# Patient Record
Sex: FEMALE | Race: WHITE | HISPANIC OR LATINO | Employment: OTHER | URBAN - METROPOLITAN AREA
[De-identification: names, ages, dates, MRNs, and addresses within clinical notes are randomized per-mention and may not be internally consistent; named-entity substitution may affect disease eponyms.]

---

## 2020-05-14 RX ORDER — RALOXIFENE HYDROCHLORIDE 60 MG/1
TABLET, FILM COATED ORAL
Qty: 90 TABLET | OUTPATIENT
Start: 2020-05-14

## 2020-05-19 ENCOUNTER — OFFICE VISIT (OUTPATIENT)
Dept: OBSTETRICS AND GYNECOLOGY | Facility: CLINIC | Age: 70
End: 2020-05-19
Payer: MEDICARE

## 2020-05-19 VITALS
WEIGHT: 196.81 LBS | DIASTOLIC BLOOD PRESSURE: 70 MMHG | SYSTOLIC BLOOD PRESSURE: 144 MMHG | HEIGHT: 66 IN | BODY MASS INDEX: 31.63 KG/M2

## 2020-05-19 DIAGNOSIS — Z78.0 POST-MENOPAUSAL: ICD-10-CM

## 2020-05-19 DIAGNOSIS — Z12.39 BREAST CANCER SCREENING: ICD-10-CM

## 2020-05-19 DIAGNOSIS — R63.5 WEIGHT GAIN: ICD-10-CM

## 2020-05-19 DIAGNOSIS — Z01.419 WELL WOMAN EXAM WITH ROUTINE GYNECOLOGICAL EXAM: Primary | ICD-10-CM

## 2020-05-19 DIAGNOSIS — Z12.31 ENCOUNTER FOR SCREENING MAMMOGRAM FOR MALIGNANT NEOPLASM OF BREAST: ICD-10-CM

## 2020-05-19 PROCEDURE — G0101 PR CA SCREEN;PELVIC/BREAST EXAM: ICD-10-PCS | Mod: S$GLB,,, | Performed by: OBSTETRICS & GYNECOLOGY

## 2020-05-19 PROCEDURE — G0101 CA SCREEN;PELVIC/BREAST EXAM: HCPCS | Mod: S$GLB,,, | Performed by: OBSTETRICS & GYNECOLOGY

## 2020-05-19 RX ORDER — FOLIC ACID 1 MG/1
TABLET ORAL
COMMUNITY
Start: 2020-04-12 | End: 2023-06-20

## 2020-05-19 RX ORDER — ROPINIROLE 0.25 MG/1
TABLET, FILM COATED ORAL
COMMUNITY
Start: 2020-05-08 | End: 2023-06-20

## 2020-05-19 RX ORDER — METHOTREXATE 20 MG/.4ML
INJECTION, SOLUTION SUBCUTANEOUS
COMMUNITY
Start: 2020-03-16 | End: 2022-03-17 | Stop reason: ALTCHOICE

## 2020-05-19 RX ORDER — DULOXETIN HYDROCHLORIDE 60 MG/1
CAPSULE, DELAYED RELEASE ORAL
COMMUNITY
Start: 2020-04-19

## 2020-05-19 RX ORDER — PREGABALIN 75 MG/1
CAPSULE ORAL
COMMUNITY
Start: 2020-05-12 | End: 2023-06-20

## 2020-05-19 RX ORDER — DONEPEZIL HYDROCHLORIDE 10 MG/1
TABLET, FILM COATED ORAL
COMMUNITY
Start: 2020-05-10 | End: 2023-06-20

## 2020-05-19 RX ORDER — HYDROXYCHLOROQUINE SULFATE 200 MG/1
TABLET, FILM COATED ORAL
COMMUNITY
Start: 2020-03-11 | End: 2023-06-20

## 2020-05-19 RX ORDER — RIFAXIMIN 550 MG/1
TABLET ORAL
COMMUNITY
Start: 2020-04-28 | End: 2022-03-17 | Stop reason: ALTCHOICE

## 2020-05-19 RX ORDER — LEVOCETIRIZINE DIHYDROCHLORIDE 5 MG/1
TABLET, FILM COATED ORAL
COMMUNITY
Start: 2020-03-09

## 2020-05-19 RX ORDER — RALOXIFENE HYDROCHLORIDE 60 MG/1
TABLET, FILM COATED ORAL
COMMUNITY
Start: 2020-02-26

## 2020-05-19 RX ORDER — MONTELUKAST SODIUM 10 MG/1
TABLET ORAL
COMMUNITY
Start: 2020-05-11

## 2020-05-19 RX ORDER — LEVOTHYROXINE SODIUM 88 UG/1
TABLET ORAL
COMMUNITY
Start: 2020-04-15

## 2020-05-19 RX ORDER — INSULIN GLARGINE 100 [IU]/ML
INJECTION, SOLUTION SUBCUTANEOUS
COMMUNITY
Start: 2020-05-04

## 2020-05-19 RX ORDER — BELIMUMAB 200 MG/ML
SOLUTION SUBCUTANEOUS
COMMUNITY
Start: 2020-04-30

## 2020-05-19 RX ORDER — SAXAGLIPTIN AND METFORMIN HYDROCHLORIDE 2.5; 1 MG/1; MG/1
TABLET, FILM COATED, EXTENDED RELEASE ORAL
COMMUNITY
Start: 2020-05-10 | End: 2023-06-20

## 2020-05-19 RX ORDER — OMEPRAZOLE 40 MG/1
CAPSULE, DELAYED RELEASE ORAL
COMMUNITY
End: 2023-06-20

## 2020-05-19 RX ORDER — ATORVASTATIN CALCIUM 80 MG/1
TABLET, FILM COATED ORAL
COMMUNITY
Start: 2020-04-12

## 2020-05-19 RX ORDER — DICYCLOMINE HYDROCHLORIDE 20 MG/1
TABLET ORAL
COMMUNITY
Start: 2020-05-06

## 2020-05-19 NOTE — PROGRESS NOTES
Subjective:       Patient ID: Ketty Delarosa is a 69 y.o. female.    Chief Complaint:  Well Woman and Breast Cancer Screening      History of Present Illness  HPI  Annual Exam-Postmenopausal  Patient presents for annual exam. The patient has no complaints today. GYN screening history: last pap: approximate date  and was normal. The patient is taking hormone replacement therapy. Patient denies post-menopausal vaginal bleeding. The patient wears seatbelts: yes. The patient participates in regular exercise: yes. Has the patient ever been transfused or tattooed?: yes. The patient reports that there is not domestic violence in her life.    Weight Gain  Patient reports unexpected weight gain despite diabetic dietary modifications.      GYN & OB History   No LMP recorded. Patient has had a hysterectomy.    Date of Last Pap: 2018    OB History    Para Term  AB Living   2 2 2     2   SAB TAB Ectopic Multiple Live Births           2      # Outcome Date GA Lbr Dayday/2nd Weight Sex Delivery Anes PTL Lv   2 Term     M Vag-Spont   TIEN   1 Term     M Vag-Spont   TIEN       Review of Systems  Review of Systems   Constitutional: Positive for unexpected weight change (pt reports weight gain). Negative for activity change, appetite change and fatigue.   Respiratory: Negative for cough and shortness of breath.    Cardiovascular: Negative for chest pain, palpitations and leg swelling.   Gastrointestinal: Negative for abdominal pain, bloating, blood in stool and reflux.   Endocrine: Positive for diabetes. Negative for hair loss and hot flashes.   Genitourinary: Negative for bladder incontinence, dysuria, hematuria, hot flashes, pelvic pain, vaginal bleeding, vaginal discharge, vaginal pain, urinary incontinence, postmenopausal bleeding, vaginal dryness and vaginal odor.   Musculoskeletal: Negative for leg pain and myalgias.   Integumentary:  Negative for hair changes, mole/lesion, breast mass, nipple discharge, breast  skin changes and breast tenderness.   Neurological: Negative for vertigo, seizures, syncope and headaches.   Hematological: Negative for adenopathy. Does not bruise/bleed easily.   Psychiatric/Behavioral: Negative for depression and sleep disturbance. The patient is not nervous/anxious.    Breast: Positive for breast self exam.Negative for asymmetry, lump, mass, mastodynia, nipple discharge, skin changes and tenderness          Objective:    Physical Exam:   Constitutional: She is oriented to person, place, and time. Vital signs are normal. She appears well-developed and well-nourished. She is cooperative. She does not appear ill. No distress.    HENT:   Head: Normocephalic and atraumatic.     Neck: Trachea normal and normal range of motion. Neck supple. No thyroid mass and no thyromegaly present.    Cardiovascular: Normal rate, regular rhythm and normal pulses.     Pulmonary/Chest: Effort normal and breath sounds normal. No respiratory distress. Chest wall is not dull to percussion. She exhibits no mass, no tenderness, no bony tenderness, no laceration, no crepitus, no edema, no deformity, no swelling and no retraction. Right breast exhibits no inverted nipple, no mass, no nipple discharge, no skin change, no tenderness, presence, no bleeding and no swelling. Left breast exhibits no inverted nipple, no mass, no nipple discharge, no skin change, no tenderness, presence, no bleeding and no swelling. Breasts are symmetrical.        Abdominal: Normal appearance.     Genitourinary: Rectum normal and vagina normal. Pelvic exam was performed with patient supine. There is no rash, tenderness, lesion or injury on the right labia. There is no rash, tenderness, lesion or injury on the left labia. Uterus is absent. Right adnexum displays no mass, no tenderness and no fullness. Left adnexum displays no mass, no tenderness and no fullness. No rectocele, cystocele or unspecified prolapse of vaginal walls in the vagina. No vaginal  discharge found. Labial bartholins normal.Cervix exhibits absence.           Musculoskeletal: Normal range of motion and moves all extremeties.      Lymphadenopathy:     She has no axillary adenopathy.        Right: No inguinal adenopathy present.        Left: No inguinal adenopathy present.    Neurological: She is alert and oriented to person, place, and time.    Skin: Skin is warm, dry and intact. No rash noted. She is not diaphoretic. No erythema. No pallor.    Psychiatric: She has a normal mood and affect. Her speech is normal and behavior is normal. Judgment and thought content normal. Cognition and memory are normal.          Assessment:        1. Well woman exam with routine gynecological exam    2. Breast cancer screening    3. Weight gain             Plan:      [facility] for patient to meet with a dietitian and address concerns of weight gain despite implementing diabetic diet.  Order for mammogram and bone density scan sent to [facility] today.  Patient instructed to contact the clinic if she does not hear from either facility to schedule services discussed within the next 7 business days.  Patient was counseled today on current ASCCP pap smear guidelines, the recommendation for yearly pelvic and clinical breast exams, healthy diet consumption, implementing exercise routines 4-5x/week,  mammogram screenings, and breast self awareness. She is to see her PCP for other health maintenance. The patient verbalizes understanding and denies additional questions at this time.

## 2020-06-01 ENCOUNTER — TELEPHONE (OUTPATIENT)
Dept: OBSTETRICS AND GYNECOLOGY | Facility: CLINIC | Age: 70
End: 2020-06-01

## 2020-06-01 NOTE — TELEPHONE ENCOUNTER
Phone message returned, pt requesting refill on evista. Rx called out at this time, pt acknowledged understanding.

## 2020-06-01 NOTE — TELEPHONE ENCOUNTER
----- Message from Adela Martin sent at 6/1/2020  8:59 AM CDT -----  Contact: PT  Type:  RX Refill Request    Who Called: Ketty Delarosa  Refill or New Rx:raloxifene (EVISTA)   RX Name and Strength:60 mg tablet   How is the patient currently taking it? (ex. 1XDay)Sig: TK 1 T PO D:  Is this a 30 day or 90 day RX: 90 DAYS  Preferred Pharmacy with phone number:  [id number] - [place], [place] - [address]  [address]  [facility] [address]  Phone: [phone] Fax: [phone]  Local or Mail Order: Local  Ordering Provider:Tia Fraire  Would the patient rather a call back or a response via [facility]? Call back   Best Call Back Number:[phone] (cell)     Additional Information: patient is also stating her Rx was never called into her pharmacy.

## 2020-11-17 ENCOUNTER — TELEPHONE (OUTPATIENT)
Dept: OBSTETRICS AND GYNECOLOGY | Facility: CLINIC | Age: 70
End: 2020-11-17

## 2020-11-17 DIAGNOSIS — M85.80 OSTEOPENIA, UNSPECIFIED LOCATION: Primary | ICD-10-CM

## 2020-11-17 RX ORDER — ERGOCALCIFEROL 1.25 MG/1
50000 CAPSULE ORAL
Qty: 4 CAPSULE | Refills: 11 | Status: SHIPPED | OUTPATIENT
Start: 2020-11-17 | End: 2020-12-17

## 2020-11-17 NOTE — TELEPHONE ENCOUNTER
Spoke with patient and informed her that her bone density scan demonstrated osteopenia, which is weakening of the bones. Informed her we will forward a copy to her primary care provider, Dr. Casper, for ongoing evaluation and monitoring. Also, let her know we will send out a vitamin D supplement to her pharmacy for her to begin taking to promote bone health. Pt verbalized understanding. AF

## 2020-11-17 NOTE — TELEPHONE ENCOUNTER
----- Message from Tia Fraire NP sent at 11/17/2020 11:29 AM CST -----  Please call patient and inform her that her bone density scan demonstrated osteopenia, which is weakening of the bones. Inform her we will forward a copy to her primary care provider, Dr. Casper, for ongoing evaluation and monitoring. Also, please let her know we will send out a vitamin D supplement to her pharmacy for her to begin taking to promote bone health.

## 2020-11-17 NOTE — TELEPHONE ENCOUNTER
----- Message from Tia Fraire NP sent at 11/17/2020 11:28 AM CST -----  Please call and inform patient her recent mammogram results were within normal limits and instruct her to continue with annual screening. Ensure breast density letter is mailed to patient.

## 2020-11-17 NOTE — PROGRESS NOTES
Please call patient and inform her that her bone density scan demonstrated osteopenia, which is weakening of the bones. Inform her we will forward a copy to her primary care provider, Dr. Casper, for ongoing evaluation and monitoring. Also, please let her know we will send out a vitamin D supplement to her pharmacy for her to begin taking to promote bone health.

## 2020-11-17 NOTE — TELEPHONE ENCOUNTER
Advised pt mammogram was within normal limits and continue annual screening. Pt verbalized understanding. AF

## 2020-12-07 NOTE — TELEPHONE ENCOUNTER
----- Message from Roxannecesario Matthews sent at 12/7/2020  3:30 PM CST -----  Regarding: pt  Type:  RX Refill Request    Who Called: pt  Refill or New Rx:refill  RX Name and Strength:raloxifene (EVISTA) 60 mg tablet  How is the patient currently taking it? (ex. 1XDay):once a day  Is this a 30 day or 90 day RX:90  Preferred Pharmacy with phone number:   [id number] - [place], [place] - [address] [facility] [address]  [address] [facility]   [address]  [facility] [address]  Phone: [phone] Fax: [phone]  Local or Mail Order:local  Ordering Provider:Pieter  Would the patient rather a call back or a response via [facility]? Call back  Best Call Back Number:[phone]  Additional Information:

## 2020-12-09 NOTE — TELEPHONE ENCOUNTER
Please have patient contact primary care provider for refill of this medication, I have not prescribed this for her in the past.

## 2020-12-11 RX ORDER — RALOXIFENE HYDROCHLORIDE 60 MG/1
TABLET, FILM COATED ORAL
OUTPATIENT
Start: 2020-12-11

## 2022-03-17 ENCOUNTER — OFFICE VISIT (OUTPATIENT)
Dept: UROLOGY | Facility: CLINIC | Age: 72
End: 2022-03-17
Payer: MEDICARE

## 2022-03-17 VITALS
WEIGHT: 198 LBS | HEIGHT: 66 IN | BODY MASS INDEX: 31.82 KG/M2 | RESPIRATION RATE: 20 BRPM | SYSTOLIC BLOOD PRESSURE: 133 MMHG | TEMPERATURE: 99 F | HEART RATE: 78 BPM | DIASTOLIC BLOOD PRESSURE: 72 MMHG

## 2022-03-17 DIAGNOSIS — N95.2 ATROPHIC VAGINITIS: ICD-10-CM

## 2022-03-17 DIAGNOSIS — N39.0 URINARY TRACT INFECTION WITHOUT HEMATURIA, SITE UNSPECIFIED: Primary | ICD-10-CM

## 2022-03-17 PROCEDURE — 99204 PR OFFICE/OUTPT VISIT, NEW, LEVL IV, 45-59 MIN: ICD-10-PCS | Mod: S$GLB,,, | Performed by: NURSE PRACTITIONER

## 2022-03-17 PROCEDURE — 99204 OFFICE O/P NEW MOD 45 MIN: CPT | Mod: S$GLB,,, | Performed by: NURSE PRACTITIONER

## 2022-03-17 PROCEDURE — 51798 US URINE CAPACITY MEASURE: CPT | Mod: S$GLB,,, | Performed by: NURSE PRACTITIONER

## 2022-03-17 PROCEDURE — 51798 POCT BLADDER SCAN: ICD-10-PCS | Mod: S$GLB,,, | Performed by: NURSE PRACTITIONER

## 2022-03-17 RX ORDER — MULTIVITAMIN
1 TABLET ORAL DAILY
COMMUNITY

## 2022-03-17 RX ORDER — AMITRIPTYLINE HYDROCHLORIDE 25 MG/1
25 TABLET, FILM COATED ORAL NIGHTLY
COMMUNITY
Start: 2022-01-04

## 2022-03-17 RX ORDER — IMATINIB MESYLATE 400 MG/1
TABLET, FILM COATED ORAL
COMMUNITY
Start: 2022-03-07

## 2022-03-17 RX ORDER — TRAZODONE HYDROCHLORIDE 100 MG/1
100 TABLET ORAL NIGHTLY
COMMUNITY
Start: 2021-12-04 | End: 2023-06-20

## 2022-03-17 RX ORDER — ESTRADIOL 0.1 MG/G
1 CREAM VAGINAL
Qty: 60 G | Refills: 2 | Status: SHIPPED | OUTPATIENT
Start: 2022-03-18 | End: 2023-03-18

## 2022-03-17 RX ORDER — AZATHIOPRINE 50 MG/1
75 TABLET ORAL DAILY
COMMUNITY
Start: 2021-12-09

## 2022-03-17 RX ORDER — TRAMADOL HYDROCHLORIDE 50 MG/1
TABLET ORAL
COMMUNITY
Start: 2021-10-23 | End: 2023-06-20

## 2022-03-17 RX ORDER — MEMANTINE HYDROCHLORIDE 7 MG/1
7 CAPSULE, EXTENDED RELEASE ORAL DAILY
COMMUNITY
Start: 2022-02-23 | End: 2023-06-20

## 2022-03-17 RX ORDER — LANCETS 33 GAUGE
EACH MISCELLANEOUS
COMMUNITY
Start: 2022-01-22

## 2022-03-17 RX ORDER — BLOOD SUGAR DIAGNOSTIC
STRIP MISCELLANEOUS
COMMUNITY
Start: 2021-10-24

## 2022-03-17 RX ORDER — CHOLESTYRAMINE LIGHT 4 G/5.7G
POWDER, FOR SUSPENSION ORAL
COMMUNITY
Start: 2021-11-08

## 2022-03-17 RX ORDER — PILOCARPINE HYDROCHLORIDE 5 MG/1
5 TABLET, FILM COATED ORAL 3 TIMES DAILY PRN
COMMUNITY
Start: 2022-01-04

## 2022-03-17 RX ORDER — IBUPROFEN 200 MG
TABLET ORAL
COMMUNITY
Start: 2021-10-23

## 2022-03-17 NOTE — PROGRESS NOTES
Subjective:       Patient ID: Ketty Delarosa is a 71 y.o. female.    Chief Complaint: Dysuria (Difficulty with urination) and Urinary Tract Infection (Has been on 3 different antibiotics amox-clav, nitrofurantoin mono, Ciprofloxin from Jan to March. Burning with urination)      HPI: 71-year-old female seen today on referral for reportedly recurrent UTIs.  States she has been on multiple antibiotics with a negative outcome each time.  She states no cultures have been done.  She per patient she states that is been diagnosed office symptoms and urinary dip stick testing.  Her symptoms include dysuria and burning in the vaginal area.  She has seen no blood in the urine.  She does have mild stress incontinence, but states she is satisfied as his at this time.  She further reports he has symptoms of low back pain and feeling feverish with a true UTI.  She is complaining of some low back muscular pain that radiates down her left leg.  No other urologic concerns.       Past Medical History:   Past Medical History:   Diagnosis Date    Abdominal mass     Dementia     Diabetes mellitus     Fibromyalgia     Gastrointestinal stromal tumor     Hypercholesterolemia     Hypothyroidism     Lupus     Menopause     Rheumatoid arthritis     Ulcerative colitis     Urinary tract infection     Urinary, incontinence, stress female     Vaginal atrophy     Vaginal ulcer        Past Surgical Historical:   Past Surgical History:   Procedure Laterality Date    APPENDECTOMY      BACK SURGERY      X2    gastrointestional tumor removed      HYSTERECTOMY      bilateral salpingo-oophorectomy    STAB PHLEBECTOMY OF VARICOSE VEINS      TONSILLECTOMY          Medications:   Medication List with Changes/Refills   Current Medications    AMITRIPTYLINE (ELAVIL) 25 MG TABLET    Take 25 mg by mouth nightly.    ATORVASTATIN (LIPITOR) 80 MG TABLET    TK 1 T PO HS    AZATHIOPRINE (IMURAN) 50 MG TAB    Take 75 mg by mouth once daily.     "BENLYSTA 200 MG/ML ATIN        CHOLESTYRAMINE LIGHT 4 GRAM PACKET        DICYCLOMINE (BENTYL) 20 MG TABLET    TK 1 T PO BEFORE EACH MEAL AND QHS    DONEPEZIL (ARICEPT) 10 MG TABLET        DULOXETINE (CYMBALTA) 60 MG CAPSULE    TK 1 C PO QD    FOLIC ACID (FOLVITE) 1 MG TABLET    TK 1 T PO  QD    HYDROXYCHLOROQUINE (PLAQUENIL) 200 MG TABLET    TK 1 T PO BID    IMATINIB (GLEEVEC) 400 MG TAB        KOMBIGLYZE XR 2.5-1,000 MG TM24        LANTUS U-100 INSULIN 100 UNIT/ML INJECTION    INJECT 35 UNITS SUBCUTANEOUSLY QAM AND 55 UNITS QHS    LEVOCETIRIZINE (XYZAL) 5 MG TABLET    TK 1 T PO D    LEVOTHYROXINE (SYNTHROID) 88 MCG TABLET    TK 1 T PO QAM OES    MEMANTINE (NAMENDA) 7 MG CSPX    Take 7 mg by mouth once daily.    METHOTREXATE/PF (OTREXUP, PF,) 20 MG/0.4 ML ATIN    INJECT ONE PEN SUBCUTANEOUSLY ONCE A WEEK. HOLD FOR ANY/ALL INFECTIONS    MONTELUKAST (SINGULAIR) 10 MG TABLET    TK 1 T PO D    MULTIVITAMIN WITH FOLIC ACID 400 MCG TAB    Take 1 tablet by mouth once daily.    OMEPRAZOLE (PRILOSEC) 40 MG CAPSULE    omeprazole 40 mg capsule,delayed release   TK ONE C PO QD    ONETOUCH DELICA PLUS LANCET 33 GAUGE MISC    TEST FOUR TIMES DAILY BEFORE MEALS AND NIGHTLY    ONETOUCH ULTRA TEST STRP    USE TO TEST FOUR TIMES DAILY    PILOCARPINE (SALAGEN) 5 MG TAB    Take 5 mg by mouth 3 (three) times daily as needed.    PREGABALIN (LYRICA) 75 MG CAPSULE    TK ONE C PO BID    RALOXIFENE (EVISTA) 60 MG TABLET    TK 1 T PO D    ROPINIROLE (REQUIP) 0.25 MG TABLET    TK 1 T PO HS    SYRINGE AND NEEDLE,INSULIN,1ML (INSULIN SYRINGE-NEEDLE U-100) 1 ML 29 GAUGE X 7/16" SYRG    USE AS DIRECTED FIVE TIMES DAILY    TRAMADOL (ULTRAM) 50 MG TABLET        TRAZODONE (DESYREL) 100 MG TABLET    Take 100 mg by mouth every evening.    XIFAXAN 550 MG TAB    TK 1 T PO BID        Past Social History:   Social History     Socioeconomic History    Marital status:    Tobacco Use    Smoking status: Never Smoker    Smokeless tobacco: Never Used "   Substance and Sexual Activity    Alcohol use: Yes     Comment: rarely    Drug use: Not Currently    Sexual activity: Yes     Partners: Male       Allergies:   Review of patient's allergies indicates:   Allergen Reactions    Hydromorphone Hives    Hydrocodone bitartrate     Percocet [oxycodone-acetaminophen]     Percodan [oxycodone hcl-oxycodone-asa]     Vicodin [hydrocodone-acetaminophen]         Family History:   Family History   Problem Relation Age of Onset    Diabetes Mother     Diabetes Sister     Diabetes Mellitus Father     No Known Problems Brother     No Known Problems Maternal Grandmother     No Known Problems Maternal Grandfather     No Known Problems Paternal Grandmother         Review of Systems:  Review of Systems   Constitutional: Negative for activity change and appetite change.   HENT: Negative for congestion and dental problem.    Respiratory: Negative for chest tightness and shortness of breath.    Cardiovascular: Negative for chest pain.   Gastrointestinal: Negative for abdominal distention and abdominal pain.   Genitourinary: Positive for dysuria. Negative for decreased urine volume, difficulty urinating, dyspareunia, enuresis, flank pain, frequency, genital sores, hematuria, pelvic pain and urgency.   Musculoskeletal: Negative for back pain and neck pain.   Allergic/Immunologic: Negative for immunocompromised state.   Neurological: Negative for dizziness.   Hematological: Negative for adenopathy.   Psychiatric/Behavioral: Negative for agitation, behavioral problems and confusion.       Physical Exam:  Physical Exam  Constitutional:       Appearance: She is well-developed.   HENT:      Head: Normocephalic and atraumatic.   Eyes:      General: No scleral icterus.  Pulmonary:      Effort: Pulmonary effort is normal.      Breath sounds: Normal breath sounds.   Abdominal:      General: There is no distension.      Palpations: Abdomen is soft.      Tenderness: There is no abdominal  tenderness.   Genitourinary:     Exam position: Supine.      Labia:         Right: No rash, tenderness or lesion.         Left: No rash, tenderness or lesion.       Vagina: Normal.      Rectum: Normal.   Musculoskeletal:        Arms:       Cervical back: Normal range of motion.      Comments: Bilateral paraspinous muscle pain with palpation pain over the sciatic nerve area with palpation radiating down the right lower limb   Skin:     General: Skin is warm and dry.   Neurological:      Mental Status: She is alert and oriented to person, place, and time.         Assessment/Plan:   Recurring UTI--not sure that the patient has been having urinary tract infections as per patient no cultures have been done in antibiotics have been of no assistance.  I suspect she has atrophic vaginitis.  She verbalizes vaginal burning    .  Atrophic vaginitis--urinalysis is negative.  PVR is 1 mL.  Rx Estrace vaginal cream 1 g 3 times weekly.  Rx sent pharmacy record.  Also spoke to prescription specialties they will do a compound cream to help alleviate her the vaginal dryness in the short term while waiting for the Estrace vaginal cream to take effect.  Patient denies any personal history of family history of ovarian uterine or breast cancer.    Problem List Items Addressed This Visit    None     Visit Diagnoses     Urinary tract infection without hematuria, site unspecified    -  Primary    Relevant Orders    POCT Urinalysis (w/Micro Option)    POCT Bladder Scan

## 2022-03-18 LAB — POC RESIDUAL URINE VOLUME: 1 ML (ref 0–100)

## 2022-03-23 ENCOUNTER — TELEPHONE (OUTPATIENT)
Dept: UROLOGY | Facility: CLINIC | Age: 72
End: 2022-03-23
Payer: MEDICARE

## 2022-03-23 NOTE — TELEPHONE ENCOUNTER
----- Message from Marcy Trinh sent at 3/23/2022  5:01 PM CDT -----  Contact: Patient    ----- Message -----  From: Agustin Restrepo  Sent: 3/23/2022  10:47 AM CDT  To: Rodney Jonas Staff    Type:  RX  Request      Please push patient RX through again. [facility] said they don't have it        estradioL (ESTRACE) 0.01 % (0.1 mg/gram) vaginal cream            .  [facility][id number] - [place], [place] - [address] AT [facility] OF  ([address] [facility] &   [address]  [place] [address]  Phone: [phone] Fax: [phone]      Please call the patient back at [phone]

## 2022-03-23 NOTE — TELEPHONE ENCOUNTER
Pt states she has called them numerous times, they have yet to fill this for her. I advised we did send it, she should call them and I will fu with her tomorrow.

## 2022-04-06 ENCOUNTER — TELEPHONE (OUTPATIENT)
Dept: UROLOGY | Facility: CLINIC | Age: 72
End: 2022-04-06
Payer: MEDICARE

## 2022-04-06 NOTE — TELEPHONE ENCOUNTER
Left message on phone to return call. [facility] LPN    ----- Message from Anneliese Knapp sent at 4/6/2022 10:36 AM CDT -----  .Type:  Needs Medical Advice    Who Called: self  Symptoms (please be specific): uncomfortable, not being able to make it to the restroom  How long has patient had these symptoms:  2 weeks  Pharmacy name and phone #:  .  [facility][id number] - [place], [place] - [address] AT [facility] [address]  [address] [facility] &   [address]  [facility] [address]  Phone: [phone] Fax: [phone]  Would the patient rather a call back or a response via [facility]? Call back  Best Call Back Number: .[phone]    Additional Information: Pt wants to be seen earlier if possible

## 2022-04-07 ENCOUNTER — OFFICE VISIT (OUTPATIENT)
Dept: UROLOGY | Facility: CLINIC | Age: 72
End: 2022-04-07
Payer: MEDICARE

## 2022-04-07 VITALS
SYSTOLIC BLOOD PRESSURE: 131 MMHG | DIASTOLIC BLOOD PRESSURE: 73 MMHG | BODY MASS INDEX: 31.82 KG/M2 | RESPIRATION RATE: 16 BRPM | WEIGHT: 198 LBS | HEIGHT: 66 IN | TEMPERATURE: 98 F | HEART RATE: 81 BPM

## 2022-04-07 DIAGNOSIS — R30.0 DYSURIA: ICD-10-CM

## 2022-04-07 DIAGNOSIS — N39.0 URINARY TRACT INFECTION WITHOUT HEMATURIA, SITE UNSPECIFIED: Primary | ICD-10-CM

## 2022-04-07 LAB — POC RESIDUAL URINE VOLUME: 1 ML (ref 0–100)

## 2022-04-07 PROCEDURE — 51798 POCT BLADDER SCAN: ICD-10-PCS | Mod: S$GLB,,, | Performed by: NURSE PRACTITIONER

## 2022-04-07 PROCEDURE — 99214 PR OFFICE/OUTPT VISIT, EST, LEVL IV, 30-39 MIN: ICD-10-PCS | Mod: S$GLB,,, | Performed by: NURSE PRACTITIONER

## 2022-04-07 PROCEDURE — 51798 US URINE CAPACITY MEASURE: CPT | Mod: S$GLB,,, | Performed by: NURSE PRACTITIONER

## 2022-04-07 PROCEDURE — 99214 OFFICE O/P EST MOD 30 MIN: CPT | Mod: S$GLB,,, | Performed by: NURSE PRACTITIONER

## 2022-04-07 RX ORDER — PREDNISONE 5 MG/1
TABLET ORAL
COMMUNITY
Start: 2022-03-28 | End: 2022-06-15

## 2022-04-07 RX ORDER — DULAGLUTIDE 0.75 MG/.5ML
INJECTION, SOLUTION SUBCUTANEOUS
COMMUNITY
Start: 2022-04-06 | End: 2023-06-20

## 2022-04-07 NOTE — PROGRESS NOTES
Subjective:       Patient ID: Ketty Delarosa is a 71 y.o. female.    Chief Complaint: Urinary Tract Infection      HPI: 71-year-old female known service returns today follow-up atrophic vaginitis.  Last office evaluation was started on estriol vaginal cream 0.01% 3 times weekly.  She is compliant with that.  She continues with discomfort she started the medication approximately 3 weeks ago.  I would also called out from a compounding pharmacy a local topical mixture to help her will for the immediate discomfort by the hormone cream has has time to take whole.  She states that was of no help.  She since last office evaluation she is reporting some mild urgency urge incontinence without warning.  She wears several pads a day.  States this did happen to her in the past but over time it resolved.  It is now recurred since my last office visit.       Past Medical History:   Past Medical History:   Diagnosis Date    Abdominal mass     Dementia     Diabetes mellitus     Fibromyalgia     Gastrointestinal stromal tumor     Hypercholesterolemia     Hypothyroidism     Lupus     Menopause     Rheumatoid arthritis     Ulcerative colitis     Urinary tract infection     Urinary, incontinence, stress female     Vaginal atrophy     Vaginal ulcer        Past Surgical Historical:   Past Surgical History:   Procedure Laterality Date    APPENDECTOMY      BACK SURGERY      X2    gastrointestional tumor removed      HYSTERECTOMY      bilateral salpingo-oophorectomy    STAB PHLEBECTOMY OF VARICOSE VEINS      TONSILLECTOMY          Medications:   Medication List with Changes/Refills   Current Medications    AMITRIPTYLINE (ELAVIL) 25 MG TABLET    Take 25 mg by mouth nightly.    ATORVASTATIN (LIPITOR) 80 MG TABLET    TK 1 T PO HS    AZATHIOPRINE (IMURAN) 50 MG TAB    Take 75 mg by mouth once daily.    BENLYSTA 200 MG/ML ATIN        CHOLESTYRAMINE LIGHT 4 GRAM PACKET        DICYCLOMINE (BENTYL) 20 MG TABLET    TK 1 T PO  "BEFORE EACH MEAL AND QHS    DONEPEZIL (ARICEPT) 10 MG TABLET        DULOXETINE (CYMBALTA) 60 MG CAPSULE    TK 1 C PO QD    ESTRADIOL (ESTRACE) 0.01 % (0.1 MG/GRAM) VAGINAL CREAM    Place 1 g vaginally every Mon, Wed, Fri.    FOLIC ACID (FOLVITE) 1 MG TABLET    TK 1 T PO  QD    HYDROXYCHLOROQUINE (PLAQUENIL) 200 MG TABLET    TK 1 T PO BID    IMATINIB (GLEEVEC) 400 MG TAB        KOMBIGLYZE XR 2.5-1,000 MG TM24        LANTUS U-100 INSULIN 100 UNIT/ML INJECTION    INJECT 35 UNITS SUBCUTANEOUSLY QAM AND 55 UNITS QHS    LEVOCETIRIZINE (XYZAL) 5 MG TABLET    TK 1 T PO D    LEVOTHYROXINE (SYNTHROID) 88 MCG TABLET    TK 1 T PO QAM OES    MEMANTINE (NAMENDA) 7 MG CSPX    Take 7 mg by mouth once daily.    MONTELUKAST (SINGULAIR) 10 MG TABLET    TK 1 T PO D    MULTIVITAMIN WITH FOLIC ACID 400 MCG TAB    Take 1 tablet by mouth once daily.    OMEPRAZOLE (PRILOSEC) 40 MG CAPSULE    omeprazole 40 mg capsule,delayed release   TK ONE C PO QD    ONETOUCH DELICA PLUS LANCET 33 GAUGE MISC    TEST FOUR TIMES DAILY BEFORE MEALS AND NIGHTLY    ONETOUCH ULTRA TEST STRP    USE TO TEST FOUR TIMES DAILY    PILOCARPINE (SALAGEN) 5 MG TAB    Take 5 mg by mouth 3 (three) times daily as needed.    PREDNISONE (DELTASONE) 5 MG TABLET    TAKE 3 TABLETS BY MOUTH DAILY FOR 3 DAYS THEN TAKE 2 TABLETS BY MOUTH DAILY FOR 3 DAYS THEN TAKE 1 TABLET BY MOUTH DAILY FOR 4 DAYS    PREGABALIN (LYRICA) 75 MG CAPSULE    TK ONE C PO BID    RALOXIFENE (EVISTA) 60 MG TABLET    TK 1 T PO D    ROPINIROLE (REQUIP) 0.25 MG TABLET    TK 1 T PO HS    SYRINGE AND NEEDLE,INSULIN,1ML (INSULIN SYRINGE-NEEDLE U-100) 1 ML 29 GAUGE X 7/16" SYRG    USE AS DIRECTED FIVE TIMES DAILY    TRAMADOL (ULTRAM) 50 MG TABLET        TRAZODONE (DESYREL) 100 MG TABLET    Take 100 mg by mouth every evening.    TRULICITY 0.75 MG/0.5 ML PEN INJECTOR            Past Social History:   Social History     Socioeconomic History    Marital status:    Tobacco Use    Smoking status: Never Smoker "    Smokeless tobacco: Never Used   Substance and Sexual Activity    Alcohol use: Yes     Comment: rarely    Drug use: Not Currently    Sexual activity: Yes     Partners: Male       Allergies:   Review of patient's allergies indicates:   Allergen Reactions    Hydromorphone Hives    Hydrocodone bitartrate     Percocet [oxycodone-acetaminophen]     Percodan [oxycodone hcl-oxycodone-asa]     Vicodin [hydrocodone-acetaminophen]         Family History:   Family History   Problem Relation Age of Onset    Diabetes Mother     Diabetes Sister     Diabetes Mellitus Father     No Known Problems Brother     No Known Problems Maternal Grandmother     No Known Problems Maternal Grandfather     No Known Problems Paternal Grandmother         Review of Systems:  Review of Systems   Constitutional: Negative for activity change and appetite change.   HENT: Negative for congestion and dental problem.    Respiratory: Negative for chest tightness and shortness of breath.    Cardiovascular: Negative for chest pain.   Gastrointestinal: Negative for abdominal distention and abdominal pain.   Genitourinary: Positive for dysuria, enuresis and urgency. Negative for decreased urine volume, difficulty urinating, dyspareunia, flank pain, frequency, genital sores, hematuria and pelvic pain.   Musculoskeletal: Negative for back pain and neck pain.   Allergic/Immunologic: Negative for immunocompromised state.   Neurological: Negative for dizziness.   Hematological: Negative for adenopathy.   Psychiatric/Behavioral: Negative for agitation, behavioral problems and confusion.       Physical Exam:  Physical Exam  Constitutional:       Appearance: She is well-developed.   HENT:      Head: Normocephalic and atraumatic.   Eyes:      General: No scleral icterus.  Pulmonary:      Effort: Pulmonary effort is normal.      Breath sounds: Normal breath sounds.   Abdominal:      General: There is no distension.      Palpations: Abdomen is soft.       Tenderness: There is no abdominal tenderness.   Genitourinary:     Exam position: Supine.      Labia:         Right: No rash, tenderness or lesion.         Left: No rash, tenderness or lesion.       Vagina: Normal.      Rectum: Normal.   Musculoskeletal:      Cervical back: Normal range of motion.   Skin:     General: Skin is warm and dry.   Neurological:      Mental Status: She is alert and oriented to person, place, and time.         Assessment/Plan:   Atrophic vaginitis--continue estriol vaginal cream.  I spoke with compounding pharmacy at [facility] this time and they are going to compound cream that includes lidocaine, ketamine and amitriptyline.  Apply b.i.d. to the vaginal area  as directed this will be temporary till the estriol cream has chance to take hold.    Urgency urge incontinence--urinalysis negative.  PVR 0. Trial of Myrbetriq 50 mg, 1 daily, 1 month sample given.  Re-evaluate 1 month  Problem List Items Addressed This Visit    None     Visit Diagnoses     Urinary tract infection without hematuria, site unspecified    -  Primary    Relevant Orders    POCT Urinalysis (w/Micro Option)

## 2022-05-05 ENCOUNTER — OFFICE VISIT (OUTPATIENT)
Dept: UROLOGY | Facility: CLINIC | Age: 72
End: 2022-05-05
Payer: MEDICARE

## 2022-05-05 VITALS — RESPIRATION RATE: 18 BRPM | HEIGHT: 66 IN | BODY MASS INDEX: 31.82 KG/M2 | WEIGHT: 198 LBS

## 2022-05-05 DIAGNOSIS — N39.41 URGE INCONTINENCE OF URINE: Primary | ICD-10-CM

## 2022-05-05 PROCEDURE — 99214 PR OFFICE/OUTPT VISIT, EST, LEVL IV, 30-39 MIN: ICD-10-PCS | Mod: S$GLB,,, | Performed by: NURSE PRACTITIONER

## 2022-05-05 PROCEDURE — 99214 OFFICE O/P EST MOD 30 MIN: CPT | Mod: S$GLB,,, | Performed by: NURSE PRACTITIONER

## 2022-05-05 RX ORDER — OXYBUTYNIN CHLORIDE 5 MG/1
5 TABLET ORAL 3 TIMES DAILY
Qty: 90 TABLET | Refills: 11 | Status: SHIPPED | OUTPATIENT
Start: 2022-05-05 | End: 2023-06-20

## 2022-05-05 NOTE — PROGRESS NOTES
Subjective:       Patient ID: Ketty Delarosa is a 71 y.o. female.    Chief Complaint: 1 mth f/u      HPI: 71-year-old female 1 month follow-up urgency urge incontinence.  She is going to trial Myrbetriq 50 mg. She states she saw some mild improvement but there still room for more.  She continues to wear a pad.  She continues with symptoms of urgency urge incontinence.  She denies any symptoms of UTI.  She has no other urologic concerns today.  In reference to her atrophic vaginitis she continues with Estrace vaginal cream and the compound vaginal cream from prescriptions pessaries.  She states this is working well for herl.       Past Medical History:   Past Medical History:   Diagnosis Date    Abdominal mass     Dementia     Diabetes mellitus     Fibromyalgia     Gastrointestinal stromal tumor     Hypercholesterolemia     Hypothyroidism     Lupus     Menopause     Rheumatoid arthritis     Ulcerative colitis     Urinary tract infection     Urinary, incontinence, stress female     Vaginal atrophy     Vaginal ulcer        Past Surgical Historical:   Past Surgical History:   Procedure Laterality Date    APPENDECTOMY      BACK SURGERY      X2    gastrointestional tumor removed      HYSTERECTOMY      bilateral salpingo-oophorectomy    STAB PHLEBECTOMY OF VARICOSE VEINS      TONSILLECTOMY          Medications:   Medication List with Changes/Refills   New Medications    OXYBUTYNIN (DITROPAN) 5 MG TAB    Take 1 tablet (5 mg total) by mouth 3 (three) times daily.   Current Medications    AMITRIPTYLINE (ELAVIL) 25 MG TABLET    Take 25 mg by mouth nightly.    ATORVASTATIN (LIPITOR) 80 MG TABLET    TK 1 T PO HS    AZATHIOPRINE (IMURAN) 50 MG TAB    Take 75 mg by mouth once daily.    BENLYSTA 200 MG/ML ATIN        CHOLESTYRAMINE LIGHT 4 GRAM PACKET        DICYCLOMINE (BENTYL) 20 MG TABLET    TK 1 T PO BEFORE EACH MEAL AND QHS    DONEPEZIL (ARICEPT) 10 MG TABLET        DULOXETINE (CYMBALTA) 60 MG CAPSULE     "TK 1 C PO QD    ESTRADIOL (ESTRACE) 0.01 % (0.1 MG/GRAM) VAGINAL CREAM    Place 1 g vaginally every Mon, Wed, Fri.    FOLIC ACID (FOLVITE) 1 MG TABLET    TK 1 T PO  QD    HYDROXYCHLOROQUINE (PLAQUENIL) 200 MG TABLET    TK 1 T PO BID    IMATINIB (GLEEVEC) 400 MG TAB        KOMBIGLYZE XR 2.5-1,000 MG TM24        LANTUS U-100 INSULIN 100 UNIT/ML INJECTION    INJECT 35 UNITS SUBCUTANEOUSLY QAM AND 55 UNITS QHS    LEVOCETIRIZINE (XYZAL) 5 MG TABLET    TK 1 T PO D    LEVOTHYROXINE (SYNTHROID) 88 MCG TABLET    TK 1 T PO QAM OES    MEMANTINE (NAMENDA) 7 MG CSPX    Take 7 mg by mouth once daily.    MONTELUKAST (SINGULAIR) 10 MG TABLET    TK 1 T PO D    MULTIVITAMIN WITH FOLIC ACID 400 MCG TAB    Take 1 tablet by mouth once daily.    OMEPRAZOLE (PRILOSEC) 40 MG CAPSULE    omeprazole 40 mg capsule,delayed release   TK ONE C PO QD    ONETOUCH DELICA PLUS LANCET 33 GAUGE MISC    TEST FOUR TIMES DAILY BEFORE MEALS AND NIGHTLY    ONETOUCH ULTRA TEST STRP    USE TO TEST FOUR TIMES DAILY    PILOCARPINE (SALAGEN) 5 MG TAB    Take 5 mg by mouth 3 (three) times daily as needed.    PREDNISONE (DELTASONE) 5 MG TABLET    TAKE 3 TABLETS BY MOUTH DAILY FOR 3 DAYS THEN TAKE 2 TABLETS BY MOUTH DAILY FOR 3 DAYS THEN TAKE 1 TABLET BY MOUTH DAILY FOR 4 DAYS    PREGABALIN (LYRICA) 75 MG CAPSULE    TK ONE C PO BID    RALOXIFENE (EVISTA) 60 MG TABLET    TK 1 T PO D    ROPINIROLE (REQUIP) 0.25 MG TABLET    TK 1 T PO HS    SYRINGE AND NEEDLE,INSULIN,1ML (INSULIN SYRINGE-NEEDLE U-100) 1 ML 29 GAUGE X 7/16" SYRG    USE AS DIRECTED FIVE TIMES DAILY    TRAMADOL (ULTRAM) 50 MG TABLET        TRAZODONE (DESYREL) 100 MG TABLET    Take 100 mg by mouth every evening.    TRULICITY 0.75 MG/0.5 ML PEN INJECTOR            Past Social History:   Social History     Socioeconomic History    Marital status:    Tobacco Use    Smoking status: Never Smoker    Smokeless tobacco: Never Used   Substance and Sexual Activity    Alcohol use: Yes     Comment: rarely "    Drug use: Not Currently    Sexual activity: Yes     Partners: Male       Allergies:   Review of patient's allergies indicates:   Allergen Reactions    Hydromorphone Hives    Hydrocodone bitartrate     Percocet [oxycodone-acetaminophen]     Percodan [oxycodone hcl-oxycodone-asa]     Vicodin [hydrocodone-acetaminophen]         Family History:   Family History   Problem Relation Age of Onset    Diabetes Mother     Diabetes Sister     Diabetes Mellitus Father     No Known Problems Brother     No Known Problems Maternal Grandmother     No Known Problems Maternal Grandfather     No Known Problems Paternal Grandmother         Review of Systems:  Review of Systems   Constitutional: Negative for activity change and appetite change.   HENT: Negative for congestion and dental problem.    Respiratory: Negative for chest tightness and shortness of breath.    Cardiovascular: Negative for chest pain.   Gastrointestinal: Negative for abdominal distention and abdominal pain.   Genitourinary: Positive for enuresis and urgency. Negative for decreased urine volume, difficulty urinating, dyspareunia, dysuria, flank pain, frequency, genital sores, hematuria and pelvic pain.   Musculoskeletal: Negative for back pain and neck pain.   Allergic/Immunologic: Negative for immunocompromised state.   Neurological: Negative for dizziness.   Hematological: Negative for adenopathy.   Psychiatric/Behavioral: Negative for agitation, behavioral problems and confusion.       Physical Exam:  Physical Exam  Constitutional:       Appearance: She is well-developed.   HENT:      Head: Normocephalic and atraumatic.   Eyes:      General: No scleral icterus.  Pulmonary:      Effort: Pulmonary effort is normal.      Breath sounds: Normal breath sounds.   Abdominal:      General: There is no distension.      Palpations: Abdomen is soft.      Tenderness: There is no abdominal tenderness.   Genitourinary:     Exam position: Supine.      Labia:          Right: No rash, tenderness or lesion.         Left: No rash, tenderness or lesion.       Vagina: Normal.      Rectum: Normal.   Musculoskeletal:      Cervical back: Normal range of motion.   Skin:     General: Skin is warm and dry.   Neurological:      Mental Status: She is alert and oriented to person, place, and time.         Assessment/Plan:   Urgency urge incontinence--recent bladder with a sample of Myrbetriq 50 mg 1 daily.  We are going to give her dual therapy with oxybutynin 5 mg max t.i.d. Rx to pharmacy of record.  We further discussed the other anti muscarinic it is available as well as the other beta 3 agonist.  And lastly we discussed Botox.  We will rule out all oral medications before moving forward with Botox.  Patient in agreement with plan of care.  Problem List Items Addressed This Visit    None

## 2022-06-15 ENCOUNTER — OFFICE VISIT (OUTPATIENT)
Dept: UROLOGY | Facility: CLINIC | Age: 72
End: 2022-06-15
Payer: MEDICARE

## 2022-06-15 VITALS — HEART RATE: 78 BPM | DIASTOLIC BLOOD PRESSURE: 78 MMHG | SYSTOLIC BLOOD PRESSURE: 131 MMHG | TEMPERATURE: 98 F

## 2022-06-15 DIAGNOSIS — R30.0 DYSURIA: ICD-10-CM

## 2022-06-15 DIAGNOSIS — N39.41 URGE INCONTINENCE OF URINE: Primary | ICD-10-CM

## 2022-06-15 PROCEDURE — 99214 OFFICE O/P EST MOD 30 MIN: CPT | Mod: S$GLB,,, | Performed by: NURSE PRACTITIONER

## 2022-06-15 PROCEDURE — 99214 PR OFFICE/OUTPT VISIT, EST, LEVL IV, 30-39 MIN: ICD-10-PCS | Mod: S$GLB,,, | Performed by: NURSE PRACTITIONER

## 2022-06-15 RX ORDER — NITROFURANTOIN (MACROCRYSTALS) 100 MG/1
100 CAPSULE ORAL EVERY 12 HOURS
Qty: 14 CAPSULE | Refills: 0 | Status: SHIPPED | OUTPATIENT
Start: 2022-06-15 | End: 2022-06-22

## 2022-06-15 RX ORDER — COVID-19 MOLECULAR TEST ASSAY
KIT MISCELLANEOUS
COMMUNITY
Start: 2022-05-11 | End: 2023-06-20

## 2022-06-15 NOTE — PROGRESS NOTES
Pre-op instructions and surgery consent BOTOX 200 UNITS reviewed with pt. Pt verbalized understanding. Surgery consent signed by pt.

## 2022-06-15 NOTE — PROGRESS NOTES
Subjective:       Patient ID: Ketty Delarosa is a 71 y.o. female.    Chief Complaint: Urinary Incontinence (Leakage still there currently)      HPI: 71-year-old female known service history of urgency urge incontinence.  She has failed all the oral medications.  More recently I had her on dual therapy to include Myrbetriq 50 mg in oxybutynin with a negative outcome.  She is interested in Botox as we discussed last office evaluation.  Her PVRs have been 0 mL.  She does report some mild burning with urination today feels she has UTI coming on.  She has been afebrile denies any stress incontinence and no gross hematuria        Past Medical History:   Past Medical History:   Diagnosis Date    Abdominal mass     Dementia     Diabetes mellitus     Fibromyalgia     Gastrointestinal stromal tumor     Hypercholesterolemia     Hypothyroidism     Lupus     Menopause     Rheumatoid arthritis     Ulcerative colitis     Urinary tract infection     Urinary, incontinence, stress female     Vaginal atrophy     Vaginal ulcer        Past Surgical Historical:   Past Surgical History:   Procedure Laterality Date    APPENDECTOMY      BACK SURGERY      X2    gastrointestional tumor removed      HYSTERECTOMY      bilateral salpingo-oophorectomy    STAB PHLEBECTOMY OF VARICOSE VEINS      TONSILLECTOMY          Medications:   Medication List with Changes/Refills   Current Medications    AMITRIPTYLINE (ELAVIL) 25 MG TABLET    Take 25 mg by mouth nightly.    ATORVASTATIN (LIPITOR) 80 MG TABLET    TK 1 T PO HS    AZATHIOPRINE (IMURAN) 50 MG TAB    Take 75 mg by mouth once daily.    BENLYSTA 200 MG/ML ATIN        BINAXNOW COVID-19 AG SELF TEST KIT    TEST AS DIRECTED TODAY    CHOLESTYRAMINE LIGHT 4 GRAM PACKET        DICYCLOMINE (BENTYL) 20 MG TABLET    TK 1 T PO BEFORE EACH MEAL AND QHS    DONEPEZIL (ARICEPT) 10 MG TABLET        DULOXETINE (CYMBALTA) 60 MG CAPSULE    TK 1 C PO QD    ESTRADIOL (ESTRACE) 0.01 % (0.1 MG/GRAM)  "VAGINAL CREAM    Place 1 g vaginally every Mon, Wed, Fri.    FOLIC ACID (FOLVITE) 1 MG TABLET    TK 1 T PO  QD    HYDROXYCHLOROQUINE (PLAQUENIL) 200 MG TABLET    TK 1 T PO BID    IMATINIB (GLEEVEC) 400 MG TAB        KOMBIGLYZE XR 2.5-1,000 MG TM24        LANTUS U-100 INSULIN 100 UNIT/ML INJECTION    INJECT 35 UNITS SUBCUTANEOUSLY QAM AND 55 UNITS QHS    LEVOCETIRIZINE (XYZAL) 5 MG TABLET    TK 1 T PO D    LEVOTHYROXINE (SYNTHROID) 88 MCG TABLET    TK 1 T PO QAM OES    MEMANTINE (NAMENDA) 7 MG CSPX    Take 7 mg by mouth once daily.    MONTELUKAST (SINGULAIR) 10 MG TABLET    TK 1 T PO D    MULTIVITAMIN WITH FOLIC ACID 400 MCG TAB    Take 1 tablet by mouth once daily.    OMEPRAZOLE (PRILOSEC) 40 MG CAPSULE    omeprazole 40 mg capsule,delayed release   TK ONE C PO QD    ONETOUCH DELICA PLUS LANCET 33 GAUGE MISC    TEST FOUR TIMES DAILY BEFORE MEALS AND NIGHTLY    ONETOUCH ULTRA TEST STRP    USE TO TEST FOUR TIMES DAILY    OXYBUTYNIN (DITROPAN) 5 MG TAB    Take 1 tablet (5 mg total) by mouth 3 (three) times daily.    PILOCARPINE (SALAGEN) 5 MG TAB    Take 5 mg by mouth 3 (three) times daily as needed.    PREGABALIN (LYRICA) 75 MG CAPSULE    TK ONE C PO BID    RALOXIFENE (EVISTA) 60 MG TABLET    TK 1 T PO D    ROPINIROLE (REQUIP) 0.25 MG TABLET    TK 1 T PO HS    SYRINGE AND NEEDLE,INSULIN,1ML (INSULIN SYRINGE-NEEDLE U-100) 1 ML 29 GAUGE X 7/16" SYRG    USE AS DIRECTED FIVE TIMES DAILY    TRAMADOL (ULTRAM) 50 MG TABLET        TRAZODONE (DESYREL) 100 MG TABLET    Take 100 mg by mouth every evening.    TRULICITY 0.75 MG/0.5 ML PEN INJECTOR       Discontinued Medications    PREDNISONE (DELTASONE) 5 MG TABLET    TAKE 3 TABLETS BY MOUTH DAILY FOR 3 DAYS THEN TAKE 2 TABLETS BY MOUTH DAILY FOR 3 DAYS THEN TAKE 1 TABLET BY MOUTH DAILY FOR 4 DAYS        Past Social History:   Social History     Socioeconomic History    Marital status:    Tobacco Use    Smoking status: Never Smoker    Smokeless tobacco: Never Used "   Substance and Sexual Activity    Alcohol use: Yes     Comment: rarely    Drug use: Not Currently    Sexual activity: Yes     Partners: Male       Allergies:   Review of patient's allergies indicates:   Allergen Reactions    Hydromorphone Hives    Hydrocodone bitartrate     Percocet [oxycodone-acetaminophen]     Percodan [oxycodone hcl-oxycodone-asa]     Vicodin [hydrocodone-acetaminophen]         Family History:   Family History   Problem Relation Age of Onset    Diabetes Mother     Diabetes Sister     Diabetes Mellitus Father     No Known Problems Brother     No Known Problems Maternal Grandmother     No Known Problems Maternal Grandfather     No Known Problems Paternal Grandmother         Review of Systems:  Review of Systems   Constitutional: Negative for activity change and appetite change.   HENT: Negative for congestion and dental problem.    Respiratory: Negative for chest tightness and shortness of breath.    Cardiovascular: Negative for chest pain.   Gastrointestinal: Negative for abdominal distention and abdominal pain.   Genitourinary: Positive for dysuria. Negative for decreased urine volume, difficulty urinating, dyspareunia, enuresis, flank pain, frequency, genital sores, hematuria, pelvic pain and urgency.   Musculoskeletal: Negative for back pain and neck pain.   Allergic/Immunologic: Negative for immunocompromised state.   Neurological: Negative for dizziness.   Hematological: Negative for adenopathy.   Psychiatric/Behavioral: Negative for agitation, behavioral problems and confusion.       Physical Exam:  Physical Exam  Constitutional:       Appearance: She is well-developed.   HENT:      Head: Normocephalic and atraumatic.   Eyes:      General: No scleral icterus.  Pulmonary:      Effort: Pulmonary effort is normal.      Breath sounds: Normal breath sounds.   Abdominal:      General: There is no distension.      Palpations: Abdomen is soft.      Tenderness: There is no abdominal  tenderness.   Genitourinary:     Exam position: Supine.      Labia:         Right: No rash, tenderness or lesion.         Left: No rash, tenderness or lesion.       Vagina: Normal.      Rectum: Normal.   Musculoskeletal:      Cervical back: Normal range of motion.   Skin:     General: Skin is warm and dry.   Neurological:      Mental Status: She is alert and oriented to person, place, and time.         Assessment/Plan:   Urgency urge incontinence--failed conservative treatment in the form of oral medications.  See HPI above.  Schedule Botox.  PVR 0 mL    Dysuria--urinalysis WBC 20 5-503 red cells no skin cells 1+ bacteria.  Culture urine.  Will start her on Macrobid pending culture results  Problem List Items Addressed This Visit    None

## 2022-06-16 DIAGNOSIS — N39.41 URGE INCONTINENCE OF URINE: Primary | ICD-10-CM

## 2022-06-17 ENCOUNTER — TELEPHONE (OUTPATIENT)
Dept: UROLOGY | Facility: CLINIC | Age: 72
End: 2022-06-17
Payer: MEDICARE

## 2022-06-17 LAB
ANION GAP SERPL CALC-SCNC: 6 MMOL/L (ref 3–11)
APPEARANCE, UA: ABNORMAL
BACTERIA SPEC CULT: ABNORMAL /HPF
BASOPHILS NFR BLD: 0.6 % (ref 0–3)
BILIRUB UR QL STRIP: NEGATIVE MG/DL
BUN SERPL-MCNC: 12 MG/DL (ref 7–18)
BUN/CREAT SERPL: 14.81 RATIO (ref 7–18)
CALCIUM SERPL-MCNC: 8.5 MG/DL (ref 8.8–10.5)
CHLORIDE SERPL-SCNC: 108 MMOL/L (ref 100–108)
CO2 SERPL-SCNC: 28 MMOL/L (ref 21–32)
COLOR UR: ABNORMAL
CREAT SERPL-MCNC: 0.81 MG/DL (ref 0.55–1.02)
EOSINOPHIL NFR BLD: 2.8 % (ref 1–3)
ERYTHROCYTE [DISTWIDTH] IN BLOOD BY AUTOMATED COUNT: 15.9 % (ref 12.5–18)
GFR ESTIMATION: > 60
GLUCOSE (UA): NORMAL MG/DL
GLUCOSE SERPL-MCNC: 127 MG/DL (ref 70–110)
HCT VFR BLD AUTO: 39.6 % (ref 37–47)
HGB BLD-MCNC: 13.5 G/DL (ref 12–16)
HGB UR QL STRIP: 25 /UL
KETONES UR QL STRIP: NEGATIVE MG/DL
LEUKOCYTE ESTERASE UR QL STRIP: 25 /UL
LYMPHOCYTES NFR BLD: 38 % (ref 25–40)
MCH RBC QN AUTO: 33.1 PG (ref 27–31.2)
MCHC RBC AUTO-ENTMCNC: 34.1 G/DL (ref 31.8–35.4)
MCV RBC AUTO: 97.1 FL (ref 80–97)
MONOCYTES NFR BLD: 7.7 % (ref 1–15)
NEUTROPHILS # BLD AUTO: 2.37 10*3/UL (ref 1.8–7.7)
NEUTROPHILS NFR BLD: 50.7 % (ref 37–80)
NITRITE UR QL STRIP: NEGATIVE
NUCLEATED RED BLOOD CELLS: 0 %
PH UR STRIP: 6 PH (ref 5–9)
PLATELETS: 188 10*3/UL (ref 142–424)
POTASSIUM SERPL-SCNC: 4.3 MMOL/L (ref 3.6–5.2)
PROT UR QL STRIP: ABNORMAL MG/DL
RBC # BLD AUTO: 4.08 10*6/UL (ref 4.2–5.4)
RBC #/AREA URNS HPF: ABNORMAL /HPF (ref 0–2)
SERVICE COMMENT 03: ABNORMAL
SODIUM BLD-SCNC: 142 MMOL/L (ref 135–145)
SP GR UR STRIP: 1.02 (ref 1–1.03)
SPECIMEN COLLECTION METHOD, URINE: ABNORMAL
SQUAMOUS EPITHELIAL, UA: ABNORMAL /LPF
URINE CULTURE, ROUTINE: NORMAL
UROBILINOGEN UR STRIP-ACNC: NORMAL MG/DL
WBC # BLD: 4.7 10*3/UL (ref 4.6–10.2)
WBC #/AREA URNS HPF: ABNORMAL /HPF (ref 0–5)

## 2022-06-17 NOTE — TELEPHONE ENCOUNTER
Patient notified of results of culture. [facility] LPN  ----- Message from Juno Bishop NP sent at 6/17/2022 10:02 AM CDT -----  Staff noted on urine culture.  Continue Macrobid as prescribed by Brenda

## 2022-06-22 ENCOUNTER — TELEPHONE (OUTPATIENT)
Dept: UROLOGY | Facility: CLINIC | Age: 72
End: 2022-06-22
Payer: MEDICARE

## 2022-06-22 NOTE — TELEPHONE ENCOUNTER
Patient stated that she has procedure on Monday and having burning and pain. Scheduled patient for drop off urine tomorrow. [facility] LPN    ----- Message from Eun Ríos sent at 6/22/2022  4:02 PM CDT -----  Type:  Needs Medical Advice    Who Called: Ketty Delarosa\    Symptoms (please be specific): Burning, and  Leakage    How long has patient had these symptoms:  -  Pharmacy name and phone #:  -  Would the patient rather a call back or a response via [facility]?    Best Call Back Number: [phone]    Additional Information: pt is scheduled for procedure on Monday, says the medication   nitrofurantoin (MACRODANTIN) 100 MG capsule is not working, she has 3 tablets left would like to be seen tomorrow, please call

## 2022-06-23 ENCOUNTER — CLINICAL SUPPORT (OUTPATIENT)
Dept: UROLOGY | Facility: CLINIC | Age: 72
End: 2022-06-23
Payer: MEDICARE

## 2022-06-23 DIAGNOSIS — N39.0 URINARY TRACT INFECTION WITHOUT HEMATURIA, SITE UNSPECIFIED: Primary | ICD-10-CM

## 2022-06-23 RX ORDER — NITROFURANTOIN MACROCRYSTALS 50 MG/1
100 CAPSULE ORAL EVERY 12 HOURS
Qty: 14 CAPSULE | Refills: 0 | Status: SHIPPED | OUTPATIENT
Start: 2022-06-23 | End: 2022-06-30

## 2022-06-23 NOTE — PROGRESS NOTES
Patient notified that medication was sent to pharmacy on file and urine to be culture. Ok to proceed with upcoming procedure. [facility] LPN

## 2022-06-25 LAB — URINE CULTURE, ROUTINE: NORMAL

## 2022-06-27 ENCOUNTER — OUTSIDE PLACE OF SERVICE (OUTPATIENT)
Dept: UROLOGY | Facility: CLINIC | Age: 72
End: 2022-06-27
Payer: MEDICARE

## 2022-06-27 LAB
GLUCOSE SERPL-MCNC: 50 MG/DL (ref 70–105)
GLUCOSE SERPL-MCNC: 73 MG/DL (ref 70–105)
GLUCOSE SERPL-MCNC: 82 MG/DL (ref 70–105)

## 2022-06-27 PROCEDURE — 52287 PR CYSTOURETHROSCOPY WITH INJ FOR CHEMODENERVATION: ICD-10-PCS | Mod: ,,, | Performed by: UROLOGY

## 2022-06-27 PROCEDURE — 52287 CYSTOSCOPY CHEMODENERVATION: CPT | Mod: ,,, | Performed by: UROLOGY

## 2022-06-28 LAB — SPECIMEN TO PATHOLOGY: NORMAL

## 2022-06-30 ENCOUNTER — TELEPHONE (OUTPATIENT)
Dept: UROLOGY | Facility: CLINIC | Age: 72
End: 2022-06-30
Payer: MEDICARE

## 2022-06-30 NOTE — TELEPHONE ENCOUNTER
Spoke with pt and notified of results    ----- Message from Juno Bishop NP sent at 6/28/2022  5:51 PM CDT -----  No growth on urine culture

## 2022-07-26 ENCOUNTER — OFFICE VISIT (OUTPATIENT)
Dept: UROLOGY | Facility: CLINIC | Age: 72
End: 2022-07-26
Payer: MEDICARE

## 2022-07-26 VITALS
WEIGHT: 198 LBS | HEART RATE: 107 BPM | DIASTOLIC BLOOD PRESSURE: 63 MMHG | HEIGHT: 66 IN | BODY MASS INDEX: 31.82 KG/M2 | SYSTOLIC BLOOD PRESSURE: 145 MMHG

## 2022-07-26 DIAGNOSIS — R39.15 URINARY URGENCY: Primary | ICD-10-CM

## 2022-07-26 LAB — POC RESIDUAL URINE VOLUME: 0 ML (ref 0–100)

## 2022-07-26 PROCEDURE — 99214 OFFICE O/P EST MOD 30 MIN: CPT | Mod: S$GLB,,, | Performed by: UROLOGY

## 2022-07-26 PROCEDURE — 99214 PR OFFICE/OUTPT VISIT, EST, LEVL IV, 30-39 MIN: ICD-10-PCS | Mod: S$GLB,,, | Performed by: UROLOGY

## 2022-07-26 PROCEDURE — 51798 POCT BLADDER SCAN: ICD-10-PCS | Mod: S$GLB,,, | Performed by: UROLOGY

## 2022-07-26 PROCEDURE — 51798 US URINE CAPACITY MEASURE: CPT | Mod: S$GLB,,, | Performed by: UROLOGY

## 2022-07-26 NOTE — PROGRESS NOTES
Subjective:       Patient ID: Ketty Delarosa is a 71 y.o. female.    Chief Complaint: Follow-up (BOTOX)      HPI:  71-year-old female status post Botox 200 units patient states that has helped however she does still have incontinence to I asked her about her fluid intake she states she drinks 9  16 oz yo per day plus drinks at meals.    Past Medical History:   Past Medical History:   Diagnosis Date    Abdominal mass     Dementia     Diabetes mellitus     Fibromyalgia     Gastrointestinal stromal tumor     Hypercholesterolemia     Hypothyroidism     Lupus     Menopause     Rheumatoid arthritis     Ulcerative colitis     Urinary tract infection     Urinary, incontinence, stress female     Vaginal atrophy     Vaginal ulcer        Past Surgical Historical:   Past Surgical History:   Procedure Laterality Date    APPENDECTOMY      BACK SURGERY      X2    gastrointestional tumor removed      HYSTERECTOMY      bilateral salpingo-oophorectomy    STAB PHLEBECTOMY OF VARICOSE VEINS      TONSILLECTOMY          Medications:   Medication List with Changes/Refills   Current Medications    AMITRIPTYLINE (ELAVIL) 25 MG TABLET    Take 25 mg by mouth nightly.    ATORVASTATIN (LIPITOR) 80 MG TABLET    TK 1 T PO HS    AZATHIOPRINE (IMURAN) 50 MG TAB    Take 75 mg by mouth once daily.    BENLYSTA 200 MG/ML ATIN        BINAXNOW COVID-19 AG SELF TEST KIT    TEST AS DIRECTED TODAY    CHOLESTYRAMINE LIGHT 4 GRAM PACKET        DICYCLOMINE (BENTYL) 20 MG TABLET    TK 1 T PO BEFORE EACH MEAL AND QHS    DONEPEZIL (ARICEPT) 10 MG TABLET        DULOXETINE (CYMBALTA) 60 MG CAPSULE    TK 1 C PO QD    ESTRADIOL (ESTRACE) 0.01 % (0.1 MG/GRAM) VAGINAL CREAM    Place 1 g vaginally every Mon, Wed, Fri.    FOLIC ACID (FOLVITE) 1 MG TABLET    TK 1 T PO  QD    HYDROXYCHLOROQUINE (PLAQUENIL) 200 MG TABLET    TK 1 T PO BID    IMATINIB (GLEEVEC) 400 MG TAB        KOMBIGLYZE XR 2.5-1,000 MG TM24        LANTUS U-100 INSULIN 100 UNIT/ML  "INJECTION    INJECT 35 UNITS SUBCUTANEOUSLY QAM AND 55 UNITS QHS    LEVOCETIRIZINE (XYZAL) 5 MG TABLET    TK 1 T PO D    LEVOTHYROXINE (SYNTHROID) 88 MCG TABLET    TK 1 T PO QAM OES    MEMANTINE (NAMENDA) 7 MG CSPX    Take 7 mg by mouth once daily.    MONTELUKAST (SINGULAIR) 10 MG TABLET    TK 1 T PO D    MULTIVITAMIN WITH FOLIC ACID 400 MCG TAB    Take 1 tablet by mouth once daily.    OMEPRAZOLE (PRILOSEC) 40 MG CAPSULE    omeprazole 40 mg capsule,delayed release   TK ONE C PO QD    ONETOUCH DELICA PLUS LANCET 33 GAUGE MISC    TEST FOUR TIMES DAILY BEFORE MEALS AND NIGHTLY    ONETOUCH ULTRA TEST STRP    USE TO TEST FOUR TIMES DAILY    OXYBUTYNIN (DITROPAN) 5 MG TAB    Take 1 tablet (5 mg total) by mouth 3 (three) times daily.    PILOCARPINE (SALAGEN) 5 MG TAB    Take 5 mg by mouth 3 (three) times daily as needed.    PREGABALIN (LYRICA) 75 MG CAPSULE    TK ONE C PO BID    RALOXIFENE (EVISTA) 60 MG TABLET    TK 1 T PO D    ROPINIROLE (REQUIP) 0.25 MG TABLET    TK 1 T PO HS    SYRINGE AND NEEDLE,INSULIN,1ML (INSULIN SYRINGE-NEEDLE U-100) 1 ML 29 GAUGE X 7/16" SYRG    USE AS DIRECTED FIVE TIMES DAILY    TRAMADOL (ULTRAM) 50 MG TABLET        TRAZODONE (DESYREL) 100 MG TABLET    Take 100 mg by mouth every evening.    TRULICITY 0.75 MG/0.5 ML PEN INJECTOR            Past Social History:   Social History     Socioeconomic History    Marital status:    Tobacco Use    Smoking status: Never Smoker    Smokeless tobacco: Never Used   Substance and Sexual Activity    Alcohol use: Yes     Comment: rarely    Drug use: Not Currently    Sexual activity: Yes     Partners: Male       Allergies:   Review of patient's allergies indicates:   Allergen Reactions    Hydromorphone Hives    Hydrocodone bitartrate     Percocet [oxycodone-acetaminophen]     Percodan [oxycodone hcl-oxycodone-asa]     Vicodin [hydrocodone-acetaminophen]         Family History:   Family History   Problem Relation Age of Onset    Diabetes Mother  "    Diabetes Sister     Diabetes Mellitus Father     No Known Problems Brother     No Known Problems Maternal Grandmother     No Known Problems Maternal Grandfather     No Known Problems Paternal Grandmother         Review of Systems:  Review of Systems   Constitutional: Negative for activity change and appetite change.   HENT: Negative for congestion and dental problem.    Respiratory: Negative for chest tightness and shortness of breath.    Cardiovascular: Negative for chest pain.   Gastrointestinal: Negative for abdominal distention and abdominal pain.   Genitourinary: Negative for decreased urine volume, difficulty urinating, dyspareunia, dysuria, enuresis, flank pain, frequency, genital sores, hematuria, pelvic pain and urgency.   Musculoskeletal: Negative for back pain and neck pain.   Allergic/Immunologic: Negative for immunocompromised state.   Neurological: Negative for dizziness.   Hematological: Negative for adenopathy.   Psychiatric/Behavioral: Negative for agitation, behavioral problems and confusion.       Physical Exam:  Physical Exam  Constitutional:       Appearance: She is well-developed.   HENT:      Head: Normocephalic and atraumatic.      Right Ear: External ear normal.      Left Ear: External ear normal.   Eyes:      Conjunctiva/sclera: Conjunctivae normal.   Neck:      Vascular: No JVD.   Cardiovascular:      Rate and Rhythm: Normal rate and regular rhythm.   Pulmonary:      Effort: Pulmonary effort is normal. No respiratory distress.      Breath sounds: Normal breath sounds. No wheezing.   Abdominal:      General: There is no distension.      Palpations: Abdomen is soft.      Tenderness: There is no abdominal tenderness. There is no rebound.   Musculoskeletal:         General: Normal range of motion.      Cervical back: Normal range of motion.   Skin:     General: Skin is warm and dry.      Findings: No erythema or rash.   Neurological:      Mental Status: She is alert and oriented to  person, place, and time.   Psychiatric:         Behavior: Behavior normal.         Assessment/Plan:       Problem List Items Addressed This Visit    None            Status post Botox I have instructed the patient to decrease significantly her fluid intake return to clinic if this does not help

## 2023-03-09 ENCOUNTER — OFFICE VISIT (OUTPATIENT)
Dept: OBSTETRICS AND GYNECOLOGY | Facility: CLINIC | Age: 73
End: 2023-03-09
Payer: MEDICARE

## 2023-03-09 VITALS
SYSTOLIC BLOOD PRESSURE: 128 MMHG | WEIGHT: 89 LBS | HEIGHT: 66 IN | BODY MASS INDEX: 14.3 KG/M2 | DIASTOLIC BLOOD PRESSURE: 80 MMHG | HEART RATE: 98 BPM

## 2023-03-09 DIAGNOSIS — Z78.0 MENOPAUSE: ICD-10-CM

## 2023-03-09 DIAGNOSIS — Z01.419 WELL WOMAN EXAM: Primary | ICD-10-CM

## 2023-03-09 DIAGNOSIS — R23.2 HOT FLASHES: ICD-10-CM

## 2023-03-09 PROCEDURE — G0101 CA SCREEN;PELVIC/BREAST EXAM: HCPCS | Mod: S$GLB,,, | Performed by: OBSTETRICS & GYNECOLOGY

## 2023-03-09 PROCEDURE — G0101 PR CA SCREEN;PELVIC/BREAST EXAM: ICD-10-PCS | Mod: S$GLB,,, | Performed by: OBSTETRICS & GYNECOLOGY

## 2023-03-09 NOTE — PROGRESS NOTES
Subjective:       Patient ID: Ketty Delarosa is a 72 y.o. female.    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  She is recovering from this.  Patient reports that she is having hot flashes all of a sudden however she has not had them in a long period of time.    GYN & OB History  No LMP recorded. Patient has had a hysterectomy.   Date of Last Pap: No result found    OB History    Para Term  AB Living   2 2 2     2   SAB IAB Ectopic Multiple Live Births           2      # Outcome Date GA Lbr Dayday/2nd Weight Sex Delivery Anes PTL Lv   2 Term     M Vag-Spont   TIEN   1 Term     M Vag-Spont   TIEN       Review of Systems  Review of Systems   Constitutional:  Negative for activity change, appetite change, fatigue, fever and unexpected weight change.   HENT:  Negative for nasal congestion and tinnitus.    Eyes:  Negative for visual disturbance.   Respiratory:  Negative for cough and shortness of breath.    Cardiovascular:  Negative for chest pain and leg swelling.   Gastrointestinal:  Negative for abdominal pain, bloating, blood in stool, constipation and diarrhea.   Genitourinary:  Negative for bladder incontinence, dysuria, vaginal bleeding, vaginal discharge, vaginal pain, vaginal dryness and vaginal odor.   Musculoskeletal:  Negative for arthralgias, back pain and joint swelling.   Integumentary:  Negative for acne.   Neurological:  Negative for headaches.   Psychiatric/Behavioral:  Negative for depression and sleep disturbance. The patient is not nervous/anxious.          Objective:    Physical Exam:   Constitutional: She is oriented to person, place, and time. Vital signs are normal. She appears well-developed and well-nourished. She is cooperative.      Neck: No thyroid mass and no thyromegaly present.    Cardiovascular:  Normal rate, regular rhythm and normal pulses.              Pulmonary/Chest: Effort normal. No respiratory distress. Chest wall is not dull to percussion. She exhibits no mass, no bony tenderness, no laceration, no crepitus, no edema, no deformity, no swelling and no retraction. Right breast exhibits no inverted nipple, no mass, no nipple discharge, no skin change, no tenderness, presence, no bleeding and no swelling. Left breast exhibits no inverted nipple, no mass, no nipple discharge, no skin change, no tenderness, presence, no bleeding and no swelling.        Abdominal: Soft. She exhibits no distension. There is no abdominal tenderness. No hernia.     Genitourinary:    Vagina, uterus and rectum normal.      Pelvic exam was performed with patient supine.   Labial bartholins normal.There is no rash, tenderness, lesion or injury on the right labia. There is no rash, tenderness, lesion or injury on the left labia. Cervix is normal. Right adnexum displays no mass, no tenderness and no fullness. Left adnexum displays no mass, no tenderness and no fullness. No  no vaginal discharge, rectocele, cystocele or unspecified prolapse of vaginal walls in the vagina.           Musculoskeletal: Moves all extremeties.       Neurological: She is alert and oriented to person, place, and time.    Skin: Skin is warm and dry. No rash noted.    Psychiatric: She has a normal mood and affect. Her speech is normal and behavior is normal. Judgment and thought content normal.        Assessment:     Well-woman exam   Things have flashes, menopause        Plan:      Patient would like treatment for hot flashes.  All the sudden they have gotten a lot worse.  She has not checked her thyroid recently.  Discussed gabapentin, however patient can not take this medication due to her macular degeneration.  Discussed SSRI use but patient is already on Cymbalta.  Discussed clonidine however patient's blood pressure already runs on the low side.      At this time plan natural option.  Patient will follow-up in  6 weeks.  If her hot flashes have not improve may consider a small dose of estrogen, although there is risk of heart attack and stroke patient is seriously considering this option.      However this may be a side effect of her chemotherapy.

## 2023-04-10 LAB
T4, FREE: 0.94 NG/DL (ref 0.93–1.7)
TSH SERPL DL<=0.005 MIU/L-ACNC: 3.23 UIU/ML (ref 0.27–4.2)

## 2023-04-13 ENCOUNTER — TELEPHONE (OUTPATIENT)
Dept: OBSTETRICS AND GYNECOLOGY | Facility: CLINIC | Age: 73
End: 2023-04-13
Payer: MEDICARE

## 2023-04-13 NOTE — TELEPHONE ENCOUNTER
Spoke with patients verbalized clear understanding.       ----- Message from Latrice Stapleton MD sent at 4/11/2023  1:07 PM CDT -----  Please notify patient of normal TFTs

## 2023-04-23 ENCOUNTER — PATIENT MESSAGE (OUTPATIENT)
Dept: OBSTETRICS AND GYNECOLOGY | Facility: CLINIC | Age: 73
End: 2023-04-23
Payer: MEDICARE

## 2023-04-26 ENCOUNTER — OFFICE VISIT (OUTPATIENT)
Dept: OBSTETRICS AND GYNECOLOGY | Facility: CLINIC | Age: 73
End: 2023-04-26
Payer: MEDICARE

## 2023-04-26 VITALS
HEART RATE: 86 BPM | BODY MASS INDEX: 29.41 KG/M2 | SYSTOLIC BLOOD PRESSURE: 125 MMHG | DIASTOLIC BLOOD PRESSURE: 69 MMHG | HEIGHT: 66 IN | WEIGHT: 183 LBS

## 2023-04-26 DIAGNOSIS — R23.2 HOT FLASHES: Primary | ICD-10-CM

## 2023-04-26 DIAGNOSIS — Z78.0 MENOPAUSE: ICD-10-CM

## 2023-04-26 PROCEDURE — 99213 OFFICE O/P EST LOW 20 MIN: CPT | Mod: S$GLB,,, | Performed by: OBSTETRICS & GYNECOLOGY

## 2023-04-26 PROCEDURE — 99213 PR OFFICE/OUTPT VISIT, EST, LEVL III, 20-29 MIN: ICD-10-PCS | Mod: S$GLB,,, | Performed by: OBSTETRICS & GYNECOLOGY

## 2023-04-26 RX ORDER — TIRZEPATIDE 5 MG/.5ML
INJECTION, SOLUTION SUBCUTANEOUS
COMMUNITY
Start: 2023-04-10 | End: 2023-06-20

## 2023-04-26 RX ORDER — ESTRADIOL 14 UG/D
PATCH TRANSDERMAL
Qty: 3 PATCH | Refills: 4 | Status: SHIPPED | OUTPATIENT
Start: 2023-04-26 | End: 2023-05-09

## 2023-04-26 RX ORDER — VALACYCLOVIR HYDROCHLORIDE 1 G/1
1000 TABLET, FILM COATED ORAL 3 TIMES DAILY
COMMUNITY
Start: 2023-04-07 | End: 2023-06-20

## 2023-04-26 NOTE — PROGRESS NOTES
"  Subjective:      Patient ID: Ketty Delarosa is a 72 y.o. female.    Chief Complaint:  Hot Flashes (Patient presents in office with complaints of hot flashes. She is now trying "herbal" and it is not helping. )      History of Present Illness  HPI  Patient reports that hot flashes are terrible she is having 14 a day.  The herbal remedies are not working.  She would like to try another option.  She is aware of increased risk of stroke and heart attack.  However unsure how she can continue to proceed with this level of hot flashes.    GYN & OB History  No LMP recorded. Patient has had a hysterectomy.   Date of Last Pap: No result found    OB History    Para Term  AB Living   2 2 2     2   SAB IAB Ectopic Multiple Live Births           2      # Outcome Date GA Lbr Dayday/2nd Weight Sex Delivery Anes PTL Lv   2 Term     M Vag-Spont   TIEN   1 Term     M Vag-Spont   TIEN       Review of Systems  Review of Systems   Constitutional:  Negative for activity change, appetite change, chills, diaphoresis, fatigue, fever and unexpected weight change.   Respiratory:  Negative for cough and shortness of breath.    Cardiovascular:  Negative for chest pain, palpitations and leg swelling.   Gastrointestinal:  Negative for abdominal pain, bloating, constipation and diarrhea.   Genitourinary:  Positive for hot flashes. Negative for vaginal bleeding, vaginal discharge, vaginal pain, vaginal dryness and vaginal odor.   Musculoskeletal:  Negative for back pain and joint swelling.   Integumentary:  Negative for rash and acne.   Psychiatric/Behavioral:  Negative for depression. The patient is not nervous/anxious.         Objective:     Physical Exam:   Constitutional: She is oriented to person, place, and time. Vital signs are normal. She appears well-developed and well-nourished. She is cooperative.      Neck: No thyroid mass and no thyromegaly present.    Cardiovascular:  Normal rate and normal pulses.           "   Pulmonary/Chest: Effort normal. No respiratory distress.        Abdominal: Soft. She exhibits no distension. There is no abdominal tenderness. No hernia.             Musculoskeletal: Moves all extremeties.       Neurological: She is alert and oriented to person, place, and time.    Skin: Skin is warm and dry. No rash noted.    Psychiatric: She has a normal mood and affect. Her speech is normal and behavior is normal. Judgment and thought content normal.       Assessment:     No diagnosis found.   Menopause        Plan:     Discussed with patient will start her on Menostar which is the absolute lowest dose hormone that she can have.  Previously had a very long discussion with the patient in regards to hormone therapy and risks.  Patient had discontinued the therapy but her hot flashes are persisting.  We will continue to reassess in 6 weeks.

## 2023-05-09 RX ORDER — ESTRADIOL 0.03 MG/D
1 PATCH TRANSDERMAL
Qty: 4 PATCH | Refills: 11 | Status: SHIPPED | OUTPATIENT
Start: 2023-05-09 | End: 2023-05-18

## 2023-05-18 ENCOUNTER — OFFICE VISIT (OUTPATIENT)
Dept: OBSTETRICS AND GYNECOLOGY | Facility: CLINIC | Age: 73
End: 2023-05-18
Payer: MEDICARE

## 2023-05-18 VITALS
HEIGHT: 66 IN | BODY MASS INDEX: 29.41 KG/M2 | SYSTOLIC BLOOD PRESSURE: 123 MMHG | HEART RATE: 109 BPM | DIASTOLIC BLOOD PRESSURE: 69 MMHG | WEIGHT: 183 LBS

## 2023-05-18 DIAGNOSIS — R30.0 DYSURIA: Primary | ICD-10-CM

## 2023-05-18 LAB
AMORPH URATE CRY URNS QL MICRO: ABNORMAL
BACTERIA #/AREA URNS HPF: ABNORMAL /[HPF]
BILIRUB SERPL-MCNC: NEGATIVE MG/DL
BILIRUB UR QL STRIP: NEGATIVE
BLOOD URINE, POC: ABNORMAL
CLARITY UR: ABNORMAL
CLARITY, POC UA: CLEAR
COLOR UR: YELLOW
COLOR, POC UA: YELLOW
EPITHELIAL CELLS: ABNORMAL
GLUCOSE (UA): 1000 MG/DL
GLUCOSE UR QL STRIP: ABNORMAL
KETONES UR QL STRIP: NEGATIVE
KETONES UR QL STRIP: NEGATIVE MG/DL
LEUKOCYTE ESTERASE UR QL STRIP: NEGATIVE
LEUKOCYTE ESTERASE URINE, POC: NEGATIVE
MUCOUS THREADS URNS QL MICRO: NEGATIVE
NITRITE UR QL STRIP: NEGATIVE
NITRITE, POC UA: NEGATIVE
OCCULT BLOOD: ABNORMAL
PH, POC UA: 6
PH, URINE: 6 (ref 5–7.5)
PROT UR QL STRIP: 75 MG/DL
PROTEIN, POC: ABNORMAL
RBC/HPF: ABNORMAL
SP GR UR STRIP: 1.01 (ref 1–1.03)
SPECIFIC GRAVITY, POC UA: 1.01
UROBILINOGEN, POC UA: 0.2
UROBILINOGEN, URINE: NORMAL E.U./DL (ref 0–1)
WBC/HPF: NEGATIVE

## 2023-05-18 PROCEDURE — 99213 PR OFFICE/OUTPT VISIT, EST, LEVL III, 20-29 MIN: ICD-10-PCS | Mod: 25,S$GLB,, | Performed by: OBSTETRICS & GYNECOLOGY

## 2023-05-18 PROCEDURE — 81002 URINALYSIS NONAUTO W/O SCOPE: CPT | Mod: S$GLB,,, | Performed by: OBSTETRICS & GYNECOLOGY

## 2023-05-18 PROCEDURE — 96372 PR INJECTION,THERAP/PROPH/DIAG2ST, IM OR SUBCUT: ICD-10-PCS | Mod: S$GLB,,, | Performed by: OBSTETRICS & GYNECOLOGY

## 2023-05-18 PROCEDURE — 81002 POCT URINE DIPSTICK WITHOUT MICROSCOPE: ICD-10-PCS | Mod: S$GLB,,, | Performed by: OBSTETRICS & GYNECOLOGY

## 2023-05-18 PROCEDURE — 96372 THER/PROPH/DIAG INJ SC/IM: CPT | Mod: S$GLB,,, | Performed by: OBSTETRICS & GYNECOLOGY

## 2023-05-18 PROCEDURE — 99213 OFFICE O/P EST LOW 20 MIN: CPT | Mod: 25,S$GLB,, | Performed by: OBSTETRICS & GYNECOLOGY

## 2023-05-18 RX ORDER — CEFTRIAXONE 1 G/1
1 INJECTION, POWDER, FOR SOLUTION INTRAMUSCULAR; INTRAVENOUS
Status: COMPLETED | OUTPATIENT
Start: 2023-05-18 | End: 2023-05-18

## 2023-05-18 RX ORDER — ESTRADIOL 0.05 MG/D
1 FILM, EXTENDED RELEASE TRANSDERMAL
Qty: 24 PATCH | Refills: 3 | Status: SHIPPED | OUTPATIENT
Start: 2023-05-18 | End: 2024-03-18

## 2023-05-18 RX ADMIN — CEFTRIAXONE 1 G: 1 INJECTION, POWDER, FOR SOLUTION INTRAMUSCULAR; INTRAVENOUS at 02:05

## 2023-05-18 NOTE — PROGRESS NOTES
Subjective:      Patient ID: Ketty Delarosa is a 72 y.o. female.    Chief Complaint:  Urinary Tract Infection (Pt presents in office with UTI symptoms. )      History of Present Illness  Urinary Tract Infection   Pertinent negatives include no chills, constipation or rash.   Patient reports that she is having burning with urination.  She is had multiple UTIs this year.  Also she is been placing her patch and hot flashes not better.  She would really like to increase her dose of at all possible.    GYN & OB History  No LMP recorded. Patient has had a hysterectomy.   Date of Last Pap: No result found    OB History    Para Term  AB Living   2 2 2     2   SAB IAB Ectopic Multiple Live Births           2      # Outcome Date GA Lbr Dayday/2nd Weight Sex Delivery Anes PTL Lv   2 Term     M Vag-Spont   TIEN   1 Term     M Vag-Spont   TIEN       Review of Systems  Review of Systems   Constitutional:  Negative for activity change, appetite change, chills, diaphoresis, fatigue, fever and unexpected weight change.   Respiratory:  Negative for cough and shortness of breath.    Cardiovascular:  Negative for chest pain, palpitations and leg swelling.   Gastrointestinal:  Negative for abdominal pain, bloating, constipation and diarrhea.   Genitourinary:  Positive for hot flashes. Negative for vaginal bleeding, vaginal discharge, vaginal pain, vaginal dryness and vaginal odor.   Musculoskeletal:  Negative for back pain and joint swelling.   Integumentary:  Negative for rash and acne.   Psychiatric/Behavioral:  Negative for depression. The patient is not nervous/anxious.         Objective:     Physical Exam:   Constitutional: She is oriented to person, place, and time. Vital signs are normal. She appears well-developed and well-nourished. She is cooperative.      Neck: No thyroid mass and no thyromegaly present.    Cardiovascular:  Normal rate and normal pulses.             Pulmonary/Chest: Effort normal. No respiratory  distress.        Abdominal: Soft. She exhibits no distension. There is no abdominal tenderness. No hernia.             Musculoskeletal: Moves all extremeties.       Neurological: She is alert and oriented to person, place, and time.    Skin: Skin is warm and dry. No rash noted.    Psychiatric: She has a normal mood and affect. Her speech is normal and behavior is normal. Judgment and thought content normal.       Assessment:     1. Dysuria       Menopause        Plan:     Will increase patient's estradiol dose as she is very insistent on increasing it.  She has had multiple conversations in regards to the risk of estrogen and possible stroke and VTE.  Patient would like to continue at a higher dose.    In regards to the patient's UTI this was confirmed on urinalysis and will give patient Rocephin

## 2023-06-20 ENCOUNTER — OFFICE VISIT (OUTPATIENT)
Dept: UROLOGY | Facility: CLINIC | Age: 73
End: 2023-06-20
Payer: MEDICARE

## 2023-06-20 VITALS
RESPIRATION RATE: 18 BRPM | HEART RATE: 89 BPM | DIASTOLIC BLOOD PRESSURE: 72 MMHG | WEIGHT: 183 LBS | BODY MASS INDEX: 29.41 KG/M2 | HEIGHT: 66 IN | SYSTOLIC BLOOD PRESSURE: 128 MMHG

## 2023-06-20 DIAGNOSIS — R39.15 URINARY URGENCY: ICD-10-CM

## 2023-06-20 DIAGNOSIS — N39.41 URGE INCONTINENCE OF URINE: Primary | ICD-10-CM

## 2023-06-20 PROCEDURE — 99214 PR OFFICE/OUTPT VISIT, EST, LEVL IV, 30-39 MIN: ICD-10-PCS | Mod: S$GLB,,, | Performed by: UROLOGY

## 2023-06-20 PROCEDURE — 99214 OFFICE O/P EST MOD 30 MIN: CPT | Mod: S$GLB,,, | Performed by: UROLOGY

## 2023-06-20 RX ORDER — MEMANTINE HYDROCHLORIDE 10 MG/1
10 TABLET ORAL 2 TIMES DAILY
COMMUNITY
Start: 2023-06-16

## 2023-06-20 RX ORDER — OMEPRAZOLE 20 MG/1
CAPSULE, DELAYED RELEASE ORAL
COMMUNITY
Start: 2023-05-31

## 2023-06-20 RX ORDER — TIRZEPATIDE 10 MG/.5ML
INJECTION, SOLUTION SUBCUTANEOUS
COMMUNITY
Start: 2023-06-02 | End: 2024-03-18 | Stop reason: DRUGHIGH

## 2023-06-20 RX ORDER — ROPINIROLE 0.5 MG/1
TABLET, FILM COATED ORAL
COMMUNITY
Start: 2023-02-20

## 2023-06-20 RX ORDER — PREGABALIN 150 MG/1
150 CAPSULE ORAL 2 TIMES DAILY
COMMUNITY
Start: 2023-02-27

## 2023-06-20 NOTE — PROGRESS NOTES
Subjective:       Patient ID: Ketty Delarosa is a 72 y.o. female.    Chief Complaint: urge incontinence (Here to discuss botox procedure )      HPI: 72-year-old female established with our clinic.  Patient has a history of urinary urge incontinence.  She received 200 units of Botox 1 year ago.  Patient states that round of Botox lasted approximately 8 months.  She is here because her symptoms have returned.  She would like to proceed with another round of botox at this time.       Past Medical History:   Past Medical History:   Diagnosis Date    Abdominal mass     Dementia     Diabetes mellitus     Fibromyalgia     Gastrointestinal stromal tumor     Hypercholesterolemia     Hypothyroidism     Lupus     Menopause     Rheumatoid arthritis     Ulcerative colitis     Urinary tract infection     Urinary, incontinence, stress female     Vaginal atrophy     Vaginal ulcer        Past Surgical Historical:   Past Surgical History:   Procedure Laterality Date    APPENDECTOMY      BACK SURGERY      X2    gastrointestional tumor removed      HYSTERECTOMY      bilateral salpingo-oophorectomy    STAB PHLEBECTOMY OF VARICOSE VEINS      TONSILLECTOMY          Medications:   Medication List with Changes/Refills   Current Medications    AMITRIPTYLINE (ELAVIL) 25 MG TABLET    Take 25 mg by mouth nightly.    ATORVASTATIN (LIPITOR) 80 MG TABLET    TK 1 T PO HS    AZATHIOPRINE (IMURAN) 50 MG TAB    Take 75 mg by mouth once daily.    BENLYSTA 200 MG/ML ATIN        CHOLESTYRAMINE LIGHT 4 GRAM PACKET        DICYCLOMINE (BENTYL) 20 MG TABLET    TK 1 T PO BEFORE EACH MEAL AND QHS    DULOXETINE (CYMBALTA) 60 MG CAPSULE    TK 1 C PO QD    ESTRADIOL 0.05 MG/24 HR TD PTSW (VIVELLE-DOT) 0.05 MG/24 HR    Place 1 patch onto the skin twice a week.    IMATINIB (GLEEVEC) 400 MG TAB        LANTUS U-100 INSULIN 100 UNIT/ML INJECTION    INJECT 35 UNITS SUBCUTANEOUSLY QAM AND 55 UNITS QHS    LEVOCETIRIZINE (XYZAL) 5 MG TABLET    TK 1 T PO D     "LEVOTHYROXINE (SYNTHROID) 88 MCG TABLET    TK 1 T PO QAM OES    MEMANTINE (NAMENDA) 10 MG TAB    Take 10 mg by mouth 2 (two) times daily.    MONTELUKAST (SINGULAIR) 10 MG TABLET    TK 1 T PO D    MOUNJARO 10 MG/0.5 ML PNIJ    INJECT 10 MG UNDER THE SKIN ONCE EVERY WEEK    MULTIVITAMIN WITH FOLIC ACID 400 MCG TAB    Take 1 tablet by mouth once daily.    OMEPRAZOLE (PRILOSEC) 20 MG CAPSULE        ONETOUCH DELICA PLUS LANCET 33 GAUGE MISC    TEST FOUR TIMES DAILY BEFORE MEALS AND NIGHTLY    ONETOUCH ULTRA TEST STRP    USE TO TEST FOUR TIMES DAILY    PILOCARPINE (SALAGEN) 5 MG TAB    Take 5 mg by mouth 3 (three) times daily as needed.    PREGABALIN (LYRICA) 150 MG CAPSULE    Take 150 mg by mouth 2 (two) times daily.    RALOXIFENE (EVISTA) 60 MG TABLET    TK 1 T PO D    ROPINIROLE (REQUIP) 0.5 MG TABLET    TAKE 1 TABLET BY MOUTH EVERY NIGHT 1 TO 3 HOURS BEFORE BEDTIME    SYRINGE AND NEEDLE,INSULIN,1ML (INSULIN SYRINGE-NEEDLE U-100) 1 ML 29 GAUGE X 7/16" SYRG    USE AS DIRECTED FIVE TIMES DAILY   Discontinued Medications    BINAXNOW COVID-19 AG SELF TEST KIT    TEST AS DIRECTED TODAY    DONEPEZIL (ARICEPT) 10 MG TABLET        FOLIC ACID (FOLVITE) 1 MG TABLET    TK 1 T PO  QD    HYDROXYCHLOROQUINE (PLAQUENIL) 200 MG TABLET    TK 1 T PO BID    KOMBIGLYZE XR 2.5-1,000 MG TM24        MEMANTINE (NAMENDA) 7 MG CSPX    Take 7 mg by mouth once daily.    MOUNJARO 5 MG/0.5 ML PNIJ    INJECT 1 PEN INJECTOR UNDER THE SKIN ONCE A WEEK    OMEPRAZOLE (PRILOSEC) 40 MG CAPSULE    omeprazole 40 mg capsule,delayed release   TK ONE C PO QD    OXYBUTYNIN (DITROPAN) 5 MG TAB    Take 1 tablet (5 mg total) by mouth 3 (three) times daily.    PREGABALIN (LYRICA) 75 MG CAPSULE    TK ONE C PO BID    ROPINIROLE (REQUIP) 0.25 MG TABLET    TK 1 T PO HS    TRAMADOL (ULTRAM) 50 MG TABLET        TRAZODONE (DESYREL) 100 MG TABLET    Take 100 mg by mouth every evening.    TRULICITY 0.75 MG/0.5 ML PEN INJECTOR        VALACYCLOVIR (VALTREX) 1000 MG TABLET    " Take 1,000 mg by mouth 3 (three) times daily.        Past Social History:   Social History     Socioeconomic History    Marital status:    Tobacco Use    Smoking status: Never    Smokeless tobacco: Never   Substance and Sexual Activity    Alcohol use: Yes     Comment: rarely    Drug use: Not Currently    Sexual activity: Not Currently     Partners: Male       Allergies:   Review of patient's allergies indicates:   Allergen Reactions    Hydromorphone Hives    Hydrocodone bitartrate     Percocet [oxycodone-acetaminophen]     Percodan [oxycodone hcl-oxycodone-asa]     Vicodin [hydrocodone-acetaminophen]         Family History:   Family History   Problem Relation Age of Onset    Diabetes Mother     Diabetes Sister     Diabetes Mellitus Father     No Known Problems Brother     No Known Problems Maternal Grandmother     No Known Problems Maternal Grandfather     No Known Problems Paternal Grandmother         Review of Systems:  Review of Systems   Constitutional:  Negative for activity change, appetite change, chills, diaphoresis, fatigue, fever and unexpected weight change.   HENT:  Negative for congestion, dental problem, drooling, ear discharge, ear pain, facial swelling, hearing loss, mouth sores, nosebleeds, postnasal drip, rhinorrhea, sinus pressure, sinus pain, sneezing, sore throat, tinnitus, trouble swallowing and voice change.    Eyes:  Negative for photophobia, pain, discharge, redness, itching and visual disturbance.   Respiratory:  Negative for apnea, cough, choking, chest tightness, shortness of breath, wheezing and stridor.    Cardiovascular:  Negative for chest pain and leg swelling.   Gastrointestinal:  Negative for abdominal distention, abdominal pain, anal bleeding, blood in stool, constipation, diarrhea, nausea, rectal pain and vomiting.   Endocrine: Negative for cold intolerance, heat intolerance, polydipsia, polyphagia and polyuria.   Genitourinary:  Positive for urgency. Negative for  decreased urine volume, difficulty urinating, dyspareunia, dysuria, enuresis, flank pain, frequency, genital sores, hematuria, menstrual problem, pelvic pain, vaginal bleeding, vaginal discharge and vaginal pain.   Musculoskeletal:  Negative for arthralgias, back pain, gait problem, joint swelling, myalgias, neck pain and neck stiffness.   Skin:  Negative for color change, pallor, rash and wound.   Allergic/Immunologic: Negative for environmental allergies, food allergies and immunocompromised state.   Neurological:  Negative for dizziness, tremors, seizures, syncope, facial asymmetry, speech difficulty, weakness, light-headedness, numbness and headaches.   Hematological:  Negative for adenopathy. Does not bruise/bleed easily.   Psychiatric/Behavioral:  Negative for agitation, behavioral problems, confusion, decreased concentration, dysphoric mood, hallucinations, self-injury, sleep disturbance and suicidal ideas. The patient is not nervous/anxious and is not hyperactive.      Physical Exam:  Physical Exam  Exam conducted with a chaperone present.   Constitutional:       Appearance: Normal appearance.   HENT:      Head: Normocephalic.      Nose: Nose normal.      Mouth/Throat:      Mouth: Mucous membranes are moist.      Pharynx: Oropharynx is clear.   Eyes:      Extraocular Movements: Extraocular movements intact.      Conjunctiva/sclera: Conjunctivae normal.      Pupils: Pupils are equal, round, and reactive to light.   Cardiovascular:      Rate and Rhythm: Normal rate and regular rhythm.      Pulses: Normal pulses.      Heart sounds: Normal heart sounds.   Pulmonary:      Effort: Pulmonary effort is normal.      Breath sounds: Normal breath sounds.   Abdominal:      General: Abdomen is flat. Bowel sounds are normal.      Palpations: Abdomen is soft.      Hernia: There is no hernia in the left inguinal area or right inguinal area.   Genitourinary:     General: Normal vulva.      Pubic Area: No rash or pubic lice.        Labia:         Right: No rash, tenderness, lesion or injury.         Left: No rash, tenderness, lesion or injury.       Urethra: No prolapse, urethral pain, urethral swelling or urethral lesion.      Rectum: Normal.   Musculoskeletal:         General: Normal range of motion.      Cervical back: Normal range of motion and neck supple.   Lymphadenopathy:      Lower Body: No right inguinal adenopathy. No left inguinal adenopathy.   Skin:     General: Skin is warm and dry.      Capillary Refill: Capillary refill takes less than 2 seconds.   Neurological:      General: No focal deficit present.      Mental Status: She is alert and oriented to person, place, and time. Mental status is at baseline.   Psychiatric:         Mood and Affect: Mood normal.         Behavior: Behavior normal.         Thought Content: Thought content normal.         Judgment: Judgment normal.       Assessment/Plan:       Urinary urge incontinence:  We will schedule the patient for next available date for 200 units of Botox.  No other urinary complaints at this time.  PVR today is 0 mL.    I, Dr. Damián Salmon have seen and personally evaluated the patient. I have formulated the plan reviewed all pertinent imaging and clinical data.  I agree with the nurse practitioner's assessment, and I have personally formulated the plan for this patient's care as described by the midlevel.  Problem List Items Addressed This Visit    None

## 2023-06-28 DIAGNOSIS — Z78.0 MENOPAUSE: Primary | ICD-10-CM

## 2023-06-28 LAB
ANION GAP SERPL CALC-SCNC: 7 MMOL/L (ref 3–11)
APPEARANCE, UA: CLEAR
BACTERIA SPEC CULT: ABNORMAL /HPF
BASOPHILS NFR BLD: 0.4 % (ref 0–3)
BILIRUB UR QL STRIP: NEGATIVE MG/DL
BUN SERPL-MCNC: 16 MG/DL (ref 7–18)
BUN/CREAT SERPL: 18.82 RATIO (ref 7–18)
CALCIUM SERPL-MCNC: 9.1 MG/DL (ref 8.8–10.5)
CHLORIDE SERPL-SCNC: 105 MMOL/L (ref 100–108)
CO2 SERPL-SCNC: 27 MMOL/L (ref 21–32)
COLOR UR: ABNORMAL
CREAT SERPL-MCNC: 0.85 MG/DL (ref 0.55–1.02)
EOSINOPHIL NFR BLD: 1.1 % (ref 1–3)
ERYTHROCYTE [DISTWIDTH] IN BLOOD BY AUTOMATED COUNT: 14.5 % (ref 12.5–18)
GFR ESTIMATION: > 60
GLUCOSE (UA): 1000 MG/DL
GLUCOSE SERPL-MCNC: 301 MG/DL (ref 70–110)
HCT VFR BLD AUTO: 41.5 % (ref 37–47)
HGB BLD-MCNC: 13.8 G/DL (ref 12–16)
HGB UR QL STRIP: 50 /UL
KETONES UR QL STRIP: NEGATIVE MG/DL
LEUKOCYTE ESTERASE UR QL STRIP: NEGATIVE /UL
LYMPHOCYTES NFR BLD: 33.2 % (ref 25–40)
MCH RBC QN AUTO: 30.9 PG (ref 27–31.2)
MCHC RBC AUTO-ENTMCNC: 33.3 G/DL (ref 31.8–35.4)
MCV RBC AUTO: 92.8 FL (ref 80–97)
MONOCYTES NFR BLD: 7.1 % (ref 1–15)
NEUTROPHILS # BLD AUTO: 3.03 10*3/UL (ref 1.8–7.7)
NEUTROPHILS NFR BLD: 57.8 % (ref 37–80)
NITRITE UR QL STRIP: NEGATIVE
NUCLEATED RED BLOOD CELLS: 0 %
PH UR STRIP: 6 PH (ref 5–9)
PLATELETS: 166 10*3/UL (ref 142–424)
POTASSIUM SERPL-SCNC: 3.7 MMOL/L (ref 3.6–5.2)
PROT UR QL STRIP: ABNORMAL MG/DL
RBC # BLD AUTO: 4.47 10*6/UL (ref 4.2–5.4)
RBC #/AREA URNS HPF: ABNORMAL /HPF (ref 0–2)
SERVICE COMMENT 03: ABNORMAL
SODIUM BLD-SCNC: 139 MMOL/L (ref 135–145)
SP GR UR STRIP: 1.01 (ref 1–1.03)
SPECIMEN COLLECTION METHOD, URINE: ABNORMAL
SQUAMOUS EPITHELIAL, UA: ABNORMAL /LPF
UROBILINOGEN UR STRIP-ACNC: NORMAL MG/DL
WBC # BLD: 5.2 10*3/UL (ref 4.6–10.2)
WBC #/AREA URNS HPF: ABNORMAL /HPF (ref 0–5)

## 2023-07-03 ENCOUNTER — OUTSIDE PLACE OF SERVICE (OUTPATIENT)
Dept: UROLOGY | Facility: CLINIC | Age: 73
End: 2023-07-03

## 2023-07-03 DIAGNOSIS — R39.15 URINARY URGENCY: Primary | ICD-10-CM

## 2023-07-03 LAB
GLUCOSE SERPL-MCNC: 142 MG/DL (ref 70–105)
GLUCOSE SERPL-MCNC: 162 MG/DL (ref 70–105)
GLUCOSE SERPL-MCNC: 193 MG/DL (ref 70–105)
GLUCOSE SERPL-MCNC: 220 MG/DL (ref 70–105)
GLUCOSE SERPL-MCNC: 235 MG/DL (ref 70–105)

## 2023-07-03 PROCEDURE — 52287 CYSTOSCOPY CHEMODENERVATION: CPT | Mod: ,,, | Performed by: UROLOGY

## 2023-07-03 PROCEDURE — 52287 PR CYSTOURETHROSCOPY WITH INJ FOR CHEMODENERVATION: ICD-10-PCS | Mod: ,,, | Performed by: UROLOGY

## 2023-07-03 RX ORDER — CIPROFLOXACIN 500 MG/1
500 TABLET ORAL 2 TIMES DAILY
Qty: 6 TABLET | Refills: 0 | Status: SHIPPED | OUTPATIENT
Start: 2023-07-03 | End: 2023-07-06

## 2023-07-14 ENCOUNTER — TELEPHONE (OUTPATIENT)
Dept: UROLOGY | Facility: CLINIC | Age: 73
End: 2023-07-14
Payer: MEDICARE

## 2023-07-14 NOTE — TELEPHONE ENCOUNTER
Unaware of rx, contacted pt. She states she meant to contact OB. BJP    ----- Message from Flora Verduzco sent at 7/14/2023  1:34 PM CDT -----  Regarding: med refill  Contact: pt  Type:  RX Refill Request    Who Called:  Ketty Delarosa   Refill or New Rx: refill   RX Name and Strength: veozah 45mg   How is the patient currently taking it? (ex. 1XDay): 1x day  Is this a 30 day or 90 day RX: 30 day   Preferred Pharmacy with phone number:     [id number] - [place], [place] - [address] AT  &   [address]  [facility] [address]  Phone: [phone] Fax: [phone]    Local or Mail Order: local   Ordering Provider: [facility]   Would the patient rather a call back or a response via [facility]?  Call back   Best Call Back Number: [phone]  Additional Information:  States that she was given samples of the medication to try and is out of medication. States that this is her 2nd call for the refill

## 2023-07-17 DIAGNOSIS — Z78.0 MENOPAUSE: ICD-10-CM

## 2023-07-17 RX ORDER — FEZOLINETANT 45 MG/1
1 TABLET, FILM COATED ORAL DAILY
Qty: 90 TABLET | Refills: 3 | Status: SHIPPED | OUTPATIENT
Start: 2023-07-17 | End: 2023-07-17 | Stop reason: SDUPTHER

## 2023-07-17 RX ORDER — FEZOLINETANT 45 MG/1
1 TABLET, FILM COATED ORAL DAILY
Qty: 90 TABLET | Refills: 3 | Status: SHIPPED | OUTPATIENT
Start: 2023-07-17 | End: 2024-03-18 | Stop reason: SDUPTHER

## 2023-07-19 ENCOUNTER — OFFICE VISIT (OUTPATIENT)
Dept: UROLOGY | Facility: CLINIC | Age: 73
End: 2023-07-19
Payer: MEDICARE

## 2023-07-19 VITALS
WEIGHT: 183 LBS | SYSTOLIC BLOOD PRESSURE: 128 MMHG | BODY MASS INDEX: 29.41 KG/M2 | HEART RATE: 89 BPM | OXYGEN SATURATION: 96 % | HEIGHT: 66 IN | DIASTOLIC BLOOD PRESSURE: 61 MMHG

## 2023-07-19 DIAGNOSIS — R39.15 URINARY URGENCY: Primary | ICD-10-CM

## 2023-07-19 PROCEDURE — 99024 PR POST-OP FOLLOW-UP VISIT: ICD-10-PCS | Mod: S$GLB,POP,, | Performed by: FAMILY MEDICINE

## 2023-07-19 PROCEDURE — 99024 POSTOP FOLLOW-UP VISIT: CPT | Mod: S$GLB,POP,, | Performed by: FAMILY MEDICINE

## 2023-07-19 NOTE — PROGRESS NOTES
Subjective:       Patient ID: Ketty Delarosa is a 72 y.o. female.    Chief Complaint: Follow-up (Pt is here follow up post op Botox, she states shes not having any issues. )      HPI: 72 year old patient has a history of urinary urge incontinence.  She received 200 units of Botox in the past.  Patient states that round of Botox lasted approximately 9 months.  She is here for follow up. Most recent round of Botox 200 units was July 3, 2023 with Dr. Salmon.  She is doing well and has no complaints at this time.  She is happy with her symptom improvement       Follow-up  Pertinent negatives include no abdominal pain, arthralgias, chest pain, chills, congestion, coughing, diaphoresis, fatigue, fever, headaches, joint swelling, myalgias, nausea, neck pain, numbness, rash, sore throat, vomiting or weakness.      Past Medical History:   Past Medical History:   Diagnosis Date    Abdominal mass     Dementia     Diabetes mellitus     Fibromyalgia     Gastrointestinal stromal tumor     Hypercholesterolemia     Hypothyroidism     Lupus     Menopause     Rheumatoid arthritis     Ulcerative colitis     Urinary tract infection     Urinary, incontinence, stress female     Vaginal atrophy     Vaginal ulcer        Past Surgical Historical:   Past Surgical History:   Procedure Laterality Date    APPENDECTOMY      BACK SURGERY      X2    gastrointestional tumor removed      HYSTERECTOMY      bilateral salpingo-oophorectomy    STAB PHLEBECTOMY OF VARICOSE VEINS      TONSILLECTOMY          Medications:   Medication List with Changes/Refills   Current Medications    AMITRIPTYLINE (ELAVIL) 25 MG TABLET    Take 25 mg by mouth nightly.    ATORVASTATIN (LIPITOR) 80 MG TABLET    TK 1 T PO HS    AZATHIOPRINE (IMURAN) 50 MG TAB    Take 75 mg by mouth once daily.    BENLYSTA 200 MG/ML ATIN        CHOLESTYRAMINE LIGHT 4 GRAM PACKET        DICYCLOMINE (BENTYL) 20 MG TABLET    TK 1 T PO BEFORE EACH MEAL AND QHS    DULOXETINE (CYMBALTA) 60 MG  "CAPSULE    TK 1 C PO QD    ESTRADIOL 0.05 MG/24 HR TD PTSW (VIVELLE-DOT) 0.05 MG/24 HR    Place 1 patch onto the skin twice a week.    FEZOLINETANT (VEOZAH) 45 MG TAB    Take 1 tablet by mouth once daily.    IMATINIB (GLEEVEC) 400 MG TAB        LANTUS U-100 INSULIN 100 UNIT/ML INJECTION    INJECT 35 UNITS SUBCUTANEOUSLY QAM AND 55 UNITS QHS    LEVOCETIRIZINE (XYZAL) 5 MG TABLET    TK 1 T PO D    LEVOTHYROXINE (SYNTHROID) 88 MCG TABLET    TK 1 T PO QAM OES    MEMANTINE (NAMENDA) 10 MG TAB    Take 10 mg by mouth 2 (two) times daily.    MONTELUKAST (SINGULAIR) 10 MG TABLET    TK 1 T PO D    MOUNJARO 10 MG/0.5 ML PNIJ    INJECT 10 MG UNDER THE SKIN ONCE EVERY WEEK    MULTIVITAMIN WITH FOLIC ACID 400 MCG TAB    Take 1 tablet by mouth once daily.    OMEPRAZOLE (PRILOSEC) 20 MG CAPSULE        ONETOUCH DELICA PLUS LANCET 33 GAUGE MISC    TEST FOUR TIMES DAILY BEFORE MEALS AND NIGHTLY    ONETOUCH ULTRA TEST STRP    USE TO TEST FOUR TIMES DAILY    PILOCARPINE (SALAGEN) 5 MG TAB    Take 5 mg by mouth 3 (three) times daily as needed.    PREGABALIN (LYRICA) 150 MG CAPSULE    Take 150 mg by mouth 2 (two) times daily.    RALOXIFENE (EVISTA) 60 MG TABLET    TK 1 T PO D    ROPINIROLE (REQUIP) 0.5 MG TABLET    TAKE 1 TABLET BY MOUTH EVERY NIGHT 1 TO 3 HOURS BEFORE BEDTIME    SYRINGE AND NEEDLE,INSULIN,1ML (INSULIN SYRINGE-NEEDLE U-100) 1 ML 29 GAUGE X 7/16" SYRG    USE AS DIRECTED FIVE TIMES DAILY        Past Social History:   Social History     Socioeconomic History    Marital status:    Tobacco Use    Smoking status: Never    Smokeless tobacco: Never   Substance and Sexual Activity    Alcohol use: Yes     Comment: rarely    Drug use: Not Currently    Sexual activity: Not Currently     Partners: Male       Allergies:   Review of patient's allergies indicates:   Allergen Reactions    Hydromorphone Hives    Hydrocodone bitartrate     Percocet [oxycodone-acetaminophen]     Percodan [oxycodone hcl-oxycodone-asa]     Vicodin " [hydrocodone-acetaminophen]         Family History:   Family History   Problem Relation Age of Onset    Diabetes Mother     Diabetes Sister     Diabetes Mellitus Father     No Known Problems Brother     No Known Problems Maternal Grandmother     No Known Problems Maternal Grandfather     No Known Problems Paternal Grandmother         Review of Systems:  Review of Systems   Constitutional:  Negative for activity change, appetite change, chills, diaphoresis, fatigue, fever and unexpected weight change.   HENT:  Negative for congestion, dental problem, drooling, ear discharge, ear pain, facial swelling, hearing loss, mouth sores, nosebleeds, postnasal drip, rhinorrhea, sinus pressure, sinus pain, sneezing, sore throat, tinnitus, trouble swallowing and voice change.    Eyes:  Negative for photophobia, pain, discharge, redness, itching and visual disturbance.   Respiratory:  Negative for apnea, cough, choking, chest tightness, shortness of breath, wheezing and stridor.    Cardiovascular:  Negative for chest pain and leg swelling.   Gastrointestinal:  Negative for abdominal distention, abdominal pain, anal bleeding, blood in stool, constipation, diarrhea, nausea, rectal pain and vomiting.   Endocrine: Negative for cold intolerance, heat intolerance, polydipsia, polyphagia and polyuria.   Genitourinary: Negative.  Negative for decreased urine volume, difficulty urinating, dyspareunia, dysuria, enuresis, flank pain, frequency, genital sores, hematuria, menstrual problem, pelvic pain, urgency, vaginal bleeding, vaginal discharge and vaginal pain.   Musculoskeletal:  Negative for arthralgias, back pain, gait problem, joint swelling, myalgias, neck pain and neck stiffness.   Skin:  Negative for color change, pallor, rash and wound.   Allergic/Immunologic: Negative for environmental allergies, food allergies and immunocompromised state.   Neurological:  Negative for dizziness, tremors, seizures, syncope, facial asymmetry,  speech difficulty, weakness, light-headedness, numbness and headaches.   Hematological:  Negative for adenopathy. Does not bruise/bleed easily.   Psychiatric/Behavioral:  Negative for agitation, behavioral problems, confusion, decreased concentration, dysphoric mood, hallucinations, self-injury, sleep disturbance and suicidal ideas. The patient is not nervous/anxious and is not hyperactive.      Physical Exam:  Physical Exam  Exam conducted with a chaperone present.   Constitutional:       Appearance: Normal appearance.   HENT:      Head: Normocephalic.      Nose: Nose normal.      Mouth/Throat:      Mouth: Mucous membranes are moist.      Pharynx: Oropharynx is clear.   Eyes:      Extraocular Movements: Extraocular movements intact.      Conjunctiva/sclera: Conjunctivae normal.      Pupils: Pupils are equal, round, and reactive to light.   Cardiovascular:      Rate and Rhythm: Normal rate and regular rhythm.      Pulses: Normal pulses.      Heart sounds: Normal heart sounds.   Pulmonary:      Effort: Pulmonary effort is normal.      Breath sounds: Normal breath sounds.   Abdominal:      General: Abdomen is flat. Bowel sounds are normal.      Palpations: Abdomen is soft.      Hernia: There is no hernia in the left inguinal area or right inguinal area.   Genitourinary:     General: Normal vulva.      Pubic Area: No rash or pubic lice.       Labia:         Right: No rash, tenderness, lesion or injury.         Left: No rash, tenderness, lesion or injury.       Urethra: No prolapse, urethral pain, urethral swelling or urethral lesion.      Rectum: Normal.   Musculoskeletal:         General: Normal range of motion.      Cervical back: Normal range of motion and neck supple.   Lymphadenopathy:      Lower Body: No right inguinal adenopathy. No left inguinal adenopathy.   Skin:     General: Skin is warm and dry.      Capillary Refill: Capillary refill takes less than 2 seconds.   Neurological:      General: No focal deficit  present.      Mental Status: She is alert and oriented to person, place, and time. Mental status is at baseline.   Psychiatric:         Mood and Affect: Mood normal.         Behavior: Behavior normal.         Thought Content: Thought content normal.         Judgment: Judgment normal.       Assessment/Plan:       Postop Botox:  Patient received 200 units of Botox on 07/03/2023.  She is doing well and has no complaints.  Instructed patient to follow up when her symptoms of urinary urge incontinence return for another round Botox.  Bladder scan today was 0 mL  Problem List Items Addressed This Visit    None  Visit Diagnoses       Urinary urgency    -  Primary    Relevant Orders    POCT Bladder Scan

## 2024-02-07 ENCOUNTER — PATIENT MESSAGE (OUTPATIENT)
Dept: OBSTETRICS AND GYNECOLOGY | Facility: CLINIC | Age: 74
End: 2024-02-07
Payer: MEDICARE

## 2024-02-27 ENCOUNTER — PATIENT MESSAGE (OUTPATIENT)
Dept: OBSTETRICS AND GYNECOLOGY | Facility: CLINIC | Age: 74
End: 2024-02-27
Payer: MEDICARE

## 2024-03-18 ENCOUNTER — OFFICE VISIT (OUTPATIENT)
Dept: OBSTETRICS AND GYNECOLOGY | Facility: CLINIC | Age: 74
End: 2024-03-18
Payer: MEDICARE

## 2024-03-18 VITALS
HEART RATE: 92 BPM | DIASTOLIC BLOOD PRESSURE: 67 MMHG | BODY MASS INDEX: 28.12 KG/M2 | HEIGHT: 66 IN | SYSTOLIC BLOOD PRESSURE: 101 MMHG | WEIGHT: 175 LBS

## 2024-03-18 DIAGNOSIS — N95.1 MENOPAUSAL VASOMOTOR SYNDROME: ICD-10-CM

## 2024-03-18 DIAGNOSIS — Z01.419 WELL WOMAN EXAM: Primary | ICD-10-CM

## 2024-03-18 DIAGNOSIS — Z78.0 MENOPAUSE: ICD-10-CM

## 2024-03-18 DIAGNOSIS — Z12.31 OTHER SCREENING MAMMOGRAM: ICD-10-CM

## 2024-03-18 PROCEDURE — G0101 CA SCREEN;PELVIC/BREAST EXAM: HCPCS | Mod: GZ,S$GLB,, | Performed by: NURSE PRACTITIONER

## 2024-03-18 RX ORDER — TIRZEPATIDE 12.5 MG/.5ML
12.5 INJECTION, SOLUTION SUBCUTANEOUS WEEKLY
COMMUNITY
Start: 2024-01-26

## 2024-03-18 RX ORDER — FEZOLINETANT 45 MG/1
1 TABLET, FILM COATED ORAL DAILY
Qty: 90 TABLET | Refills: 3 | Status: SHIPPED | OUTPATIENT
Start: 2024-03-18 | End: 2025-03-18

## 2024-03-18 NOTE — PROGRESS NOTES
"    Subjective:       Patient ID: Ketty Delarosa is a 73 y.o. female.    Chief Complaint:  Well Woman      History of Present Illness   Presents for annual gyn exam. History and past labs reviewed with patient.    Complains of vasomotor ss.  No improvement with estrogen pills, patch, or vaginal ring.   Reports improvement with Veozah sample from last provider.   Rarely sexually active       OB History          2    Para   2    Term   2            AB        Living   2         SAB        IAB        Ectopic        Multiple        Live Births   2                  Review of Systems  Review of Systems   Constitutional:  Negative for chills and fever.   Respiratory:  Negative for shortness of breath.    Cardiovascular:  Negative for chest pain.   Gastrointestinal:  Negative for abdominal pain, blood in stool, constipation, diarrhea, nausea, vomiting and reflux.   Genitourinary:  Positive for hot flashes and vaginal dryness. Negative for dysmenorrhea, dyspareunia, dysuria, hematuria, menorrhagia, menstrual problem, pelvic pain, vaginal bleeding, vaginal discharge and postcoital bleeding.   Musculoskeletal:  Negative for arthralgias and joint swelling.   Integumentary:  Negative for rash, hair changes, breast mass, nipple discharge and breast skin changes.   Psychiatric/Behavioral:  Negative for depression. The patient is not nervous/anxious.    Breast: Negative for asymmetry, lump, mass, nipple discharge and skin changes          Objective:     Vitals:    24 0908   BP: 101/67   Pulse: 92   Weight: 79.4 kg (175 lb)   Height: 5' 6" (1.676 m)        Physical Exam:   Constitutional: She is oriented to person, place, and time. She appears well-developed and well-nourished.    HENT:   Head: Normocephalic and atraumatic.     Neck: No thyromegaly present.    Cardiovascular:  Normal rate, regular rhythm and normal heart sounds.             Pulmonary/Chest: Effort normal and breath sounds normal. No respiratory " distress.        Abdominal: Soft. There is no abdominal tenderness.     Genitourinary:    Inguinal canal and rectum normal.      Pelvic exam was performed with patient supine.   The external female genitalia was normal.     Labial bartholins normal.There is an absent right adnexa and an absent left adnexa. Vaginal atrophy noted. Cervix is absent.Uterus is absent.           Musculoskeletal: Moves all extremeties.       Neurological: She is alert and oriented to person, place, and time.    Skin: Skin is warm and dry.    Psychiatric: She has a normal mood and affect. Her behavior is normal. Judgment and thought content normal.       breast exam wnl- no nipple dc, skin changes, masses or lymph nodes palpated bilaterally    Assessment:     1. Well woman exam    2. Other screening mammogram    3. Menopause    4. Menopausal vasomotor syndrome              Plan:       Well woman exam    Other screening mammogram  -     Mammo Digital Screening Bilat; Future; Expected date: 03/18/2024    Menopause  -     Comprehensive metabolic panel; Future; Expected date: 03/18/2024    Menopausal vasomotor syndrome  -     fezolinetant (VEOZAH) 45 mg Tab; Take 1 tablet by mouth once daily.  Dispense: 90 tablet; Refill: 3  -     Comprehensive metabolic panel; Future; Expected date: 03/18/2024       Pap not indicated   Patient was counseled today on A.C.S. Pap guidelines and recommendations for yearly pelvic exams, mammograms and monthly self breast exams; to see her PCP for other health maintenance.   Vaccine UTD   Risk assessment for inherited gyn cancer done   Women's daily Multi vitamin  Healthy diet, exercise, and life style        Follow up in about 1 year (around 3/18/2025).

## 2024-03-19 ENCOUNTER — TELEPHONE (OUTPATIENT)
Dept: OBSTETRICS AND GYNECOLOGY | Facility: CLINIC | Age: 74
End: 2024-03-19
Payer: MEDICARE

## 2024-03-19 NOTE — TELEPHONE ENCOUNTER
--  Pt is aware of her appt next year on 3/25/25. Ashli          --- Message from Cherelle Contreras sent at 3/19/2024  1:52 PM CDT -----  Contact: pt  Pt calling wanting to know if she needs to come for appt on 3/25 because she just seen on 3/18/2024 and she can be reached at [phone]     Thanks,

## 2024-03-26 ENCOUNTER — TELEPHONE (OUTPATIENT)
Dept: OBSTETRICS AND GYNECOLOGY | Facility: CLINIC | Age: 74
End: 2024-03-26
Payer: MEDICARE

## 2024-03-26 ENCOUNTER — PATIENT MESSAGE (OUTPATIENT)
Dept: OBSTETRICS AND GYNECOLOGY | Facility: CLINIC | Age: 74
End: 2024-03-26
Payer: MEDICARE

## 2024-09-23 ENCOUNTER — TELEPHONE (OUTPATIENT)
Dept: OBSTETRICS AND GYNECOLOGY | Facility: CLINIC | Age: 74
End: 2024-09-23
Payer: MEDICARE

## 2024-09-23 NOTE — TELEPHONE ENCOUNTER
Discussed she has exhausted all treatment for vasomotor ssx. Monjaro may be interfering with absorption of veozah.

## 2024-09-23 NOTE — TELEPHONE ENCOUNTER
----- Message from Erin Jonas sent at 9/23/2024 10:13 AM CDT -----  Contact: Ketty  Type:  Same Day Appointment Request    Caller is requesting a same day appointment.    Name of Caller:Ketty  When is the first available appointment?unknown  Symptoms:Hot flashes/Sweating at unusual times  Best Call Back Number:[phone]   Additional Information: Patient reports taking medication; however, reports does not appear it is working well and experiencing more hot flashes than usual. Please give patient an immediate call back to assist.   Thank you,  GH

## 2024-11-19 ENCOUNTER — PATIENT MESSAGE (OUTPATIENT)
Dept: GASTROENTEROLOGY | Facility: CLINIC | Age: 74
End: 2024-11-19
Payer: MEDICARE

## 2025-03-19 DIAGNOSIS — R30.0 DYSURIA: Primary | ICD-10-CM

## 2025-03-20 ENCOUNTER — TELEPHONE (OUTPATIENT)
Dept: OBSTETRICS AND GYNECOLOGY | Facility: CLINIC | Age: 75
End: 2025-03-20
Payer: MEDICARE

## 2025-03-20 NOTE — TELEPHONE ENCOUNTER
Spoke to pt an appt is schedule for 3/23/26 at 10. Pt is aware and voices understanding. Ashli        From: Deya Carr  Sent: 3/19/2025   4:28 PM CDT  To: Jamin Kolb Staff    Type:  Appointment Request  Name of Caller:ptWhen is the first available appointment?No accessSymptoms:pt states there has been conflicts in scheduling with her appt and she needs to be seen as soon as possible. Would the patient rather a call back or a response via [facility]? Call Best Call Back Number:[phone] Information: please give pt a call in regards, thank you.

## 2025-03-25 ENCOUNTER — TELEPHONE (OUTPATIENT)
Dept: OBSTETRICS AND GYNECOLOGY | Facility: CLINIC | Age: 75
End: 2025-03-25

## 2025-03-25 NOTE — TELEPHONE ENCOUNTER
--Spoke to pt and let her know that we are going to send her script the [facility]. Pt is aware and voices understanding. Ashli -          -- Message from Danitza sent at 3/24/2025 10:36 AM CDT -----  Regarding: refill  Contact: Mr. delarosa  Type:  RX Refill RequestWho Called: Mr. DelarosaRefill or New Rx: new RX Name and Strength: Veoza How is the patient currently taking it? (ex. 1XDay):Is this a 30 day or 90 day RX:Preferred Pharmacy with phone number: [facility] - [address] [place] [address]Wemfp: [phone] Fax: [phone] Local or Mail Order: local Ordering Provider: XAVI Stapleton filled Would the patient rather a call back or a response via [facility]? callBest Call Back Number: [phone] (home)  Additional Information:  this requires an authorization because the patient changed insurance. She only has 4 left.

## 2025-03-26 ENCOUNTER — RESULTS FOLLOW-UP (OUTPATIENT)
Dept: OBSTETRICS AND GYNECOLOGY | Facility: CLINIC | Age: 75
End: 2025-03-26

## 2025-03-26 ENCOUNTER — TELEPHONE (OUTPATIENT)
Dept: OBSTETRICS AND GYNECOLOGY | Facility: CLINIC | Age: 75
End: 2025-03-26
Payer: MEDICARE

## 2025-03-26 NOTE — TELEPHONE ENCOUNTER
Pt had called the wrong office, she wanted to call Dr. Frank office for her urine culture. Spoke to Dr. Frank office  and his  office is going to handle her urine culture.  Ashli

## 2025-03-28 ENCOUNTER — TELEPHONE (OUTPATIENT)
Dept: OBSTETRICS AND GYNECOLOGY | Facility: CLINIC | Age: 75
End: 2025-03-28
Payer: MEDICARE

## 2025-03-28 DIAGNOSIS — Z78.0 MENOPAUSE: Primary | ICD-10-CM

## 2025-03-28 DIAGNOSIS — R23.2 HOT FLASHES: ICD-10-CM

## 2025-03-28 RX ORDER — FEZOLINETANT 45 MG/1
45 TABLET, FILM COATED ORAL DAILY
Qty: 90 TABLET | Refills: 3 | Status: ACTIVE | OUTPATIENT
Start: 2025-03-28

## 2025-03-28 NOTE — TELEPHONE ENCOUNTER
Spoke to pt and schedule her an appt on 4/8 at 10 with Mrs Kolb. Pt is aware and voices understanding. Ashli

## 2025-04-01 DIAGNOSIS — R23.2 HOT FLASHES: ICD-10-CM

## 2025-04-01 DIAGNOSIS — Z78.0 MENOPAUSE: ICD-10-CM

## 2025-04-01 RX ORDER — FEZOLINETANT 45 MG/1
45 TABLET, FILM COATED ORAL DAILY
Qty: 90 TABLET | Refills: 3 | Status: SHIPPED | OUTPATIENT
Start: 2025-04-01

## 2025-04-01 NOTE — TELEPHONE ENCOUNTER
Spoke to pt and prescription is being send out to her pharmacy. Pt V/U/. Ashli        ---- Message from Eun sent at 4/1/2025 11:07 AM CDT -----  Type:  Needs Medical AdviceWho Called: Ketty Olson (please be specific): - How long has patient had these symptoms:  -Pharmacy name and phone #:  [facility] - [place], [place] - [address] [address]Hzjsw: [phone] Fax: [phone] the patient rather a call back or a response via [facility]?  Best Call Back Number: [phone] Information:  pt asked to speak w/ Ms Ashli, says her medication fezolinetant (VEOZAH) 45 mg Tab was sent to wrong pharmacy should have been sent to pharmacy listed above please call

## 2025-04-09 ENCOUNTER — OFFICE VISIT (OUTPATIENT)
Dept: OBSTETRICS AND GYNECOLOGY | Facility: CLINIC | Age: 75
End: 2025-04-09
Payer: MEDICARE

## 2025-04-09 VITALS
HEIGHT: 66 IN | BODY MASS INDEX: 28.12 KG/M2 | DIASTOLIC BLOOD PRESSURE: 60 MMHG | HEART RATE: 94 BPM | WEIGHT: 175 LBS | SYSTOLIC BLOOD PRESSURE: 115 MMHG

## 2025-04-09 DIAGNOSIS — Z12.11 SCREENING FOR COLON CANCER: ICD-10-CM

## 2025-04-09 DIAGNOSIS — N95.8 OTHER SPECIFIED MENOPAUSAL AND PERIMENOPAUSAL DISORDERS: ICD-10-CM

## 2025-04-09 DIAGNOSIS — Z13.820 SCREENING FOR OSTEOPOROSIS: ICD-10-CM

## 2025-04-09 DIAGNOSIS — Z12.31 SCREENING MAMMOGRAM FOR BREAST CANCER: ICD-10-CM

## 2025-04-09 DIAGNOSIS — N95.2 VAGINAL ATROPHY: Primary | ICD-10-CM

## 2025-04-09 DIAGNOSIS — N39.46 MIXED INCONTINENCE: ICD-10-CM

## 2025-04-09 PROCEDURE — G0101 CA SCREEN;PELVIC/BREAST EXAM: HCPCS | Mod: S$PBB,,, | Performed by: NURSE PRACTITIONER

## 2025-04-09 RX ORDER — ESTRADIOL 0.1 MG/G
1 CREAM VAGINAL
Qty: 42.5 G | Refills: 2 | Status: SHIPPED | OUTPATIENT
Start: 2025-04-10 | End: 2026-04-10

## 2025-04-09 NOTE — PROGRESS NOTES
"    Subjective:       Patient ID: Ketty Delarosa is a 74 y.o. female.    Chief Complaint:  Well Woman      History of Present Illness   Presents for annual gyn exam. History and past labs reviewed with patient.    Complains of mixed urinary incontinence that worsened over the last 6 months   Using Veozah for vasomotor ssx-liver enzymes normal      OB History          2    Para   2    Term   2            AB        Living   2         SAB        IAB        Ectopic        Multiple        Live Births   2                  Review of Systems  Review of Systems   Constitutional:  Negative for chills and fever.   Respiratory:  Negative for shortness of breath.    Cardiovascular:  Negative for chest pain.   Gastrointestinal:  Negative for abdominal pain, blood in stool, constipation, diarrhea, nausea, vomiting and reflux.   Genitourinary:  Positive for urinary incontinence. Negative for dysmenorrhea, dyspareunia, dysuria, hematuria, hot flashes, menorrhagia, menstrual problem, pelvic pain, vaginal bleeding, vaginal discharge, postcoital bleeding and vaginal dryness.   Musculoskeletal:  Negative for arthralgias and joint swelling.   Integumentary:  Negative for rash, hair changes, breast mass, nipple discharge and breast skin changes.   Psychiatric/Behavioral:  Negative for depression. The patient is not nervous/anxious.    Breast: Negative for asymmetry, lump, mass, nipple discharge and skin changes          Objective:     Vitals:    25 1015   BP: 115/60   Pulse: 94   Weight: 79.4 kg (175 lb)   Height: 5' 6" (1.676 m)        Physical Exam:   Constitutional: She is oriented to person, place, and time. She appears well-developed and well-nourished. No distress.    HENT:   Head: Normocephalic and atraumatic.    Eyes: Conjunctivae and EOM are normal.    Neck: No tracheal deviation present. No thyromegaly present.    Cardiovascular:  Normal rate.      Exam reveals no clubbing, no cyanosis and no edema.      "   Pulmonary/Chest: Effort normal. No respiratory distress.        Abdominal: Soft. She exhibits no distension and no mass. There is no abdominal tenderness. There is no rebound and no guarding. No hernia.     Genitourinary:    Vagina and rectum normal.      Pelvic exam was performed with patient supine.   There is no rash, tenderness, lesion or injury on the right labia. There is no rash, tenderness, lesion or injury on the left labia. Right adnexum displays no mass, no tenderness and no fullness. Left adnexum displays no mass, no tenderness and no fullness. There is cystocele in the vagina. No vaginal discharge in the vagina. Vaginal atrophy noted. Cervix is absent.Uterus is absent.               Neurological: She is alert and oriented to person, place, and time.    Skin: Skin is warm and dry. She is not diaphoretic. No cyanosis. Nails show no clubbing.    Psychiatric: She has a normal mood and affect. Her behavior is normal. Judgment and thought content normal.       breast exam wnl- no nipple dc, skin changes, masses or lymph nodes palpated bilaterally    Female chaperone present during exam     Assessment:     1. Vaginal atrophy    2. Mixed incontinence    3. Screening mammogram for breast cancer    4. Screening for osteoporosis    5. Screening for colon cancer    6. Other specified menopausal and perimenopausal disorders              Plan:       Vaginal atrophy  -     estradioL (ESTRACE) 0.01 % (0.1 mg/gram) vaginal cream; Place 1 g vaginally twice a week.  Dispense: 42.5 g; Refill: 2    Mixed incontinence  -     Ambulatory referral/consult to Urology; Future; Expected date: 04/16/2025  -     estradioL (ESTRACE) 0.01 % (0.1 mg/gram) vaginal cream; Place 1 g vaginally twice a week.  Dispense: 42.5 g; Refill: 2    Screening mammogram for breast cancer  -     Mammo Digital Screening Bilat; Future; Expected date: 04/09/2025    Screening for osteoporosis  -     DXA Bone Density Axial Skeleton 1 or more sites;  Future; Expected date: 04/09/2025    Screening for colon cancer  -     Ambulatory referral/consult to Gastroenterology; Future; Expected date: 04/16/2025    Other specified menopausal and perimenopausal disorders  -     DXA Bone Density Axial Skeleton 1 or more sites; Future; Expected date: 04/09/2025         Colonoscopy referral sent   BMD ordered  Vaccine UTD   Risk assessment for inherited gyn cancer done   Women's daily Multi vitamin  Healthy diet, exercise, and life style   Patient was counseled today on A.C.S. Pap guidelines and recommendations for yearly pelvic exams, mammograms and monthly self breast exams; to see her PCP for other health maintenance.       Follow up in about 1 year (around 4/9/2026).

## 2025-05-06 ENCOUNTER — TELEPHONE (OUTPATIENT)
Dept: OBSTETRICS AND GYNECOLOGY | Facility: CLINIC | Age: 75
End: 2025-05-06
Payer: MEDICARE

## 2025-05-06 ENCOUNTER — OFFICE VISIT (OUTPATIENT)
Dept: UROLOGY | Facility: CLINIC | Age: 75
End: 2025-05-06
Payer: MEDICARE

## 2025-05-06 VITALS
WEIGHT: 162 LBS | HEIGHT: 66 IN | SYSTOLIC BLOOD PRESSURE: 189 MMHG | DIASTOLIC BLOOD PRESSURE: 74 MMHG | HEART RATE: 91 BPM | BODY MASS INDEX: 26.03 KG/M2

## 2025-05-06 DIAGNOSIS — N39.46 MIXED INCONTINENCE: ICD-10-CM

## 2025-05-06 DIAGNOSIS — R39.15 URINARY URGENCY: Primary | ICD-10-CM

## 2025-05-06 DIAGNOSIS — N39.41 URGE INCONTINENCE OF URINE: ICD-10-CM

## 2025-05-06 PROCEDURE — 99214 OFFICE O/P EST MOD 30 MIN: CPT | Mod: S$PBB,,, | Performed by: UROLOGY

## 2025-05-06 RX ORDER — NITROFURANTOIN 25; 75 MG/1; MG/1
100 CAPSULE ORAL DAILY
Qty: 90 CAPSULE | Refills: 3 | Status: SHIPPED | OUTPATIENT
Start: 2025-05-06 | End: 2026-05-06

## 2025-05-06 NOTE — PROGRESS NOTES
Subjective:       Patient ID: Ketty Delarosa is a 74 y.o. female.    Chief Complaint: No chief complaint on file.      HPI:  74-year-old female patient presents to the clinic for follow-up.  She has previously seen for urinary urge incontinence.  Last round of Botox with our clinic was in 2023.  Since that time the patient has seen Dr. Minor and had urethral biopsy which was benign.  There was some question of the lesion being remnants of macro plastic.  Today she complains of urinary urge incontinence and having to go through 8-12 pads per day.     Past Medical History:   Past Medical History:   Diagnosis Date    Abdominal mass     Dementia     Diabetes mellitus     Fibromyalgia     Gastrointestinal stromal tumor     Hypercholesterolemia     Hypothyroidism     Lupus     Menopause     Rheumatoid arthritis     Ulcerative colitis     Urinary tract infection     Urinary, incontinence, stress female     Vaginal atrophy     Vaginal ulcer        Past Surgical Historical:   Past Surgical History:   Procedure Laterality Date    APPENDECTOMY      BACK SURGERY      X2    gastrointestional tumor removed      PARTIAL HYSTERECTOMY  1974    STAB PHLEBECTOMY OF VARICOSE VEINS      TONSILLECTOMY          Medications:   Medication List with Changes/Refills   New Medications    NITROFURANTOIN, MACROCRYSTAL-MONOHYDRATE, (MACROBID) 100 MG CAPSULE    Take 1 capsule (100 mg total) by mouth Daily.   Current Medications    AMITRIPTYLINE (ELAVIL) 25 MG TABLET    Take 25 mg by mouth nightly.    ATORVASTATIN (LIPITOR) 80 MG TABLET    TK 1 T PO HS    AZATHIOPRINE (IMURAN) 50 MG TAB    Take 75 mg by mouth once daily.    BENLYSTA 200 MG/ML ATIN        CHOLESTYRAMINE LIGHT 4 GRAM PACKET        DICYCLOMINE (BENTYL) 20 MG TABLET    TK 1 T PO BEFORE EACH MEAL AND QHS    DULOXETINE (CYMBALTA) 60 MG CAPSULE    TK 1 C PO QD    ESTRADIOL (ESTRACE) 0.01 % (0.1 MG/GRAM) VAGINAL CREAM    Place 1 g vaginally twice a week.    FEZOLINETANT (VEOZAH) 45 MG  "TAB    Take 1 tablet (45 mg) by mouth Daily.    IMATINIB (GLEEVEC) 400 MG TAB        LANTUS U-100 INSULIN 100 UNIT/ML INJECTION    INJECT 35 UNITS SUBCUTANEOUSLY QAM AND 55 UNITS QHS    LEVOCETIRIZINE (XYZAL) 5 MG TABLET    TK 1 T PO D    LEVOTHYROXINE (SYNTHROID) 88 MCG TABLET    TK 1 T PO QAM OES    MEMANTINE (NAMENDA) 10 MG TAB    Take 10 mg by mouth 2 (two) times daily.    MONTELUKAST (SINGULAIR) 10 MG TABLET    TK 1 T PO D    MOUNJARO 12.5 MG/0.5 ML PNIJ    Inject 12.5 mg into the skin once a week.    MULTIVITAMIN WITH FOLIC ACID 400 MCG TAB    Take 1 tablet by mouth once daily.    OMEPRAZOLE (PRILOSEC) 20 MG CAPSULE        ONETOUCH DELICA PLUS LANCET 33 GAUGE MISC    TEST FOUR TIMES DAILY BEFORE MEALS AND NIGHTLY    ONETOUCH ULTRA TEST STRP    USE TO TEST FOUR TIMES DAILY    PILOCARPINE (SALAGEN) 5 MG TAB    Take 5 mg by mouth 3 (three) times daily as needed.    PREGABALIN (LYRICA) 150 MG CAPSULE    Take 150 mg by mouth 2 (two) times daily.    RALOXIFENE (EVISTA) 60 MG TABLET    TK 1 T PO D    ROPINIROLE (REQUIP) 0.5 MG TABLET    TAKE 1 TABLET BY MOUTH EVERY NIGHT 1 TO 3 HOURS BEFORE BEDTIME    SYRINGE AND NEEDLE,INSULIN,1ML (INSULIN SYRINGE-NEEDLE U-100) 1 ML 29 GAUGE X 7/16" SYRG    USE AS DIRECTED FIVE TIMES DAILY        Past Social History: Social History[1]    Allergies: Review of patient's allergies indicates:  No Known Allergies     Family History:   Family History   Problem Relation Name Age of Onset    Diabetes Mother      Diabetes Mellitus Father      Diabetes Sister      No Known Problems Brother      No Known Problems Maternal Grandmother      No Known Problems Maternal Grandfather      No Known Problems Paternal Grandmother      Breast cancer Maternal Aunt          Review of Systems:  Review of Systems   Constitutional:  Negative for activity change and appetite change.   HENT:  Negative for congestion and dental problem.    Respiratory:  Negative for chest tightness and shortness of breath.  "   Cardiovascular:  Negative for chest pain.   Gastrointestinal:  Negative for abdominal distention and abdominal pain.   Genitourinary:  Positive for frequency and urgency. Negative for decreased urine volume, difficulty urinating, dyspareunia, dysuria, enuresis, flank pain, genital sores, hematuria and pelvic pain.   Musculoskeletal:  Negative for back pain and neck pain.   Allergic/Immunologic: Negative for immunocompromised state.   Neurological:  Negative for dizziness.   Hematological:  Negative for adenopathy.   Psychiatric/Behavioral:  Negative for agitation, behavioral problems and confusion.        Physical Exam:  Physical Exam  Constitutional:       Appearance: She is well-developed.   HENT:      Head: Normocephalic and atraumatic.      Right Ear: External ear normal.      Left Ear: External ear normal.   Eyes:      Conjunctiva/sclera: Conjunctivae normal.   Neck:      Vascular: No JVD.   Cardiovascular:      Rate and Rhythm: Normal rate and regular rhythm.   Pulmonary:      Effort: Pulmonary effort is normal. No respiratory distress.      Breath sounds: Normal breath sounds. No wheezing.   Abdominal:      General: There is no distension.      Palpations: Abdomen is soft.      Tenderness: There is no abdominal tenderness. There is no rebound.   Musculoskeletal:         General: Normal range of motion.      Cervical back: Normal range of motion.   Skin:     General: Skin is warm and dry.      Findings: No erythema or rash.   Neurological:      Mental Status: She is alert and oriented to person, place, and time.   Psychiatric:         Behavior: Behavior normal.         Assessment/Plan:     Urinary urge incontinence: PVR 0 mL.  We will provide the patient with 4 weeks of Gemtesa.  If symptoms improve on this medication we can send full prescription tear pharmacy.  Discussed repeating Botox if symptoms do not improve on Gemtesa.    Frequent urinary tract infection: We will start the patient on daily Macrobid  100 mg.  Patient currently drinking large volume of liquid which is not helping her urinary urge incontinence symptoms.  She was told to do this to help prevent infections.  Decreased total fluid intake.  Complete urine drop pulse whether office his symptoms of infection develop in the future.    Follow-up in 6 months  Problem List Items Addressed This Visit    None  Visit Diagnoses         Mixed incontinence                            [1]   Social History  Socioeconomic History    Marital status:    Tobacco Use    Smoking status: Never    Smokeless tobacco: Never   Substance and Sexual Activity    Alcohol use: Yes     Comment: rarely    Drug use: Not Currently    Sexual activity: Not Currently     Partners: Male

## 2025-05-06 NOTE — TELEPHONE ENCOUNTER
----- Message from Eun sent at 5/6/2025 12:30 PM CDT -----  Type:  Needs Medical AdviceWho Called: Ketty Olson (please be specific): - How long has patient had these symptoms:  -Pharmacy name and phone #:  -Would the patient rather a call back or a response via [facility]?  Best Call Back Number: [phone] Information:  pt needs mammo and bone density orders sent to central scheduling she she can be scheduled

## 2025-05-06 NOTE — TELEPHONE ENCOUNTER
Called pt, no answer. LVM that MMG and DEXA orders sent to internal scheduling, contact number provided to schedule.

## 2025-05-07 ENCOUNTER — TELEPHONE (OUTPATIENT)
Dept: UROLOGY | Facility: CLINIC | Age: 75
End: 2025-05-07
Payer: MEDICARE

## 2025-05-07 DIAGNOSIS — N39.41 URGE INCONTINENCE OF URINE: ICD-10-CM

## 2025-05-07 DIAGNOSIS — R39.15 URINARY URGENCY: ICD-10-CM

## 2025-05-07 DIAGNOSIS — N39.46 MIXED INCONTINENCE: ICD-10-CM

## 2025-05-07 LAB
BILIRUBIN, UA POC OHS: NEGATIVE
BLOOD, UA POC OHS: ABNORMAL
CLARITY, UA POC OHS: CLEAR
COLOR, UA POC OHS: YELLOW
GLUCOSE, UA POC OHS: NEGATIVE
KETONES, UA POC OHS: NEGATIVE
LEUKOCYTES, UA POC OHS: NEGATIVE
NITRITE, UA POC OHS: NEGATIVE
PH, UA POC OHS: 6
PROTEIN, UA POC OHS: 100
SPECIFIC GRAVITY, UA POC OHS: >=1.03
UROBILINOGEN, UA POC OHS: 1

## 2025-05-07 NOTE — TELEPHONE ENCOUNTER
Pt here in office to  samples. Samples were not available at her office visit on 5/6/25.     ----- Message from Eun sent at 5/7/2025 12:33 PM CDT -----  Type:  Needs Medical AdviceWho Called: Ketty Olson (please be specific): - How long has patient had these symptoms:  -Pharmacy name and phone #:  -Would the patient rather a call back or a response via [facility]? Be Call Back Number: [phone] Information: wants to know if the samples have come in please advise

## 2025-05-08 RX ORDER — VIBEGRON 75 MG/1
75 TABLET, FILM COATED ORAL DAILY
Qty: 14 TABLET | Refills: 0 | Status: CANCELLED | COMMUNITY
Start: 2025-05-08 | End: 2025-05-22

## 2025-05-09 DIAGNOSIS — N39.41 URGE INCONTINENCE OF URINE: ICD-10-CM

## 2025-05-09 DIAGNOSIS — N39.46 MIXED INCONTINENCE: Primary | ICD-10-CM

## 2025-05-09 DIAGNOSIS — R39.15 URINARY URGENCY: ICD-10-CM

## 2025-05-12 RX ORDER — VIBEGRON 75 MG/1
75 TABLET, FILM COATED ORAL DAILY
Qty: 14 TABLET | Refills: 0 | COMMUNITY
Start: 2025-05-12

## 2025-05-19 ENCOUNTER — TELEPHONE (OUTPATIENT)
Dept: UROLOGY | Facility: CLINIC | Age: 75
End: 2025-05-19
Payer: MEDICARE

## 2025-05-19 DIAGNOSIS — N39.46 MIXED INCONTINENCE: ICD-10-CM

## 2025-05-19 DIAGNOSIS — N39.41 URGE INCONTINENCE OF URINE: ICD-10-CM

## 2025-05-19 DIAGNOSIS — R39.15 URINARY URGENCY: ICD-10-CM

## 2025-05-19 RX ORDER — VIBEGRON 75 MG/1
75 TABLET, FILM COATED ORAL DAILY
Qty: 14 TABLET | Refills: 0 | COMMUNITY
Start: 2025-05-19

## 2025-05-19 NOTE — TELEPHONE ENCOUNTER
Medication sent.     ----- Message from Sourav sent at 5/19/2025 10:20 AM CDT -----  Contact: FARHAD THOMAS [[id number]]  ..Type:  Patient Requesting CallWho Called:FARHAD THOMAS [[id number]]Does the patient know what this is regarding?:Gemtesa 75mgWould the patient rather a call back or a response via [facility]? Call Best Call Back Number:[phone] (home) Additional Information: samples work need a prescription called [facility] - [address] [address]Bpvod: [phone] Fax: [phone]

## 2025-05-20 ENCOUNTER — PATIENT MESSAGE (OUTPATIENT)
Dept: OBSTETRICS AND GYNECOLOGY | Facility: CLINIC | Age: 75
End: 2025-05-20
Payer: MEDICARE